# Patient Record
Sex: FEMALE | Race: BLACK OR AFRICAN AMERICAN | NOT HISPANIC OR LATINO | Employment: FULL TIME | URBAN - METROPOLITAN AREA
[De-identification: names, ages, dates, MRNs, and addresses within clinical notes are randomized per-mention and may not be internally consistent; named-entity substitution may affect disease eponyms.]

---

## 2017-11-10 ENCOUNTER — LAB REQUISITION (OUTPATIENT)
Dept: LAB | Facility: HOSPITAL | Age: 32
End: 2017-11-10

## 2017-11-10 DIAGNOSIS — Z00.00 ROUTINE GENERAL MEDICAL EXAMINATION AT A HEALTH CARE FACILITY: ICD-10-CM

## 2017-11-10 PROCEDURE — 36415 COLL VENOUS BLD VENIPUNCTURE: CPT | Performed by: OBSTETRICS & GYNECOLOGY

## 2017-11-18 LAB
EXTERNAL ABO GROUPING: NORMAL
EXTERNAL ANTIBODY SCREEN: NEGATIVE
EXTERNAL CHLAMYDIA SCREEN: NORMAL
EXTERNAL GONORRHEA SCREEN: NORMAL
EXTERNAL HEPATITIS B SURFACE ANTIGEN: NEGATIVE
EXTERNAL RH FACTOR: POSITIVE
EXTERNAL RUBELLA QUALITATIVE: NORMAL
EXTERNAL SYPHILIS RPR SCREEN: NORMAL

## 2017-12-08 ENCOUNTER — LAB REQUISITION (OUTPATIENT)
Dept: LAB | Facility: HOSPITAL | Age: 32
End: 2017-12-08

## 2017-12-08 DIAGNOSIS — Z34.01 ENCOUNTER FOR SUPERVISION OF NORMAL FIRST PREGNANCY IN FIRST TRIMESTER: ICD-10-CM

## 2017-12-08 DIAGNOSIS — Z00.00 ROUTINE GENERAL MEDICAL EXAMINATION AT A HEALTH CARE FACILITY: ICD-10-CM

## 2017-12-08 PROCEDURE — 36415 COLL VENOUS BLD VENIPUNCTURE: CPT | Performed by: OBSTETRICS & GYNECOLOGY

## 2017-12-19 ENCOUNTER — TRANSCRIBE ORDERS (OUTPATIENT)
Dept: WOMENS IMAGING | Facility: HOSPITAL | Age: 32
End: 2017-12-19

## 2017-12-19 ENCOUNTER — HOSPITAL ENCOUNTER (OUTPATIENT)
Dept: WOMENS IMAGING | Facility: HOSPITAL | Age: 32
Discharge: HOME | End: 2017-12-19
Attending: OBSTETRICS & GYNECOLOGY | Admitting: OBSTETRICS & GYNECOLOGY

## 2017-12-19 ENCOUNTER — OFFICE VISIT (OUTPATIENT)
Dept: OBSTETRICS AND GYNECOLOGY | Facility: HOSPITAL | Age: 32
End: 2017-12-19

## 2017-12-19 VITALS — SYSTOLIC BLOOD PRESSURE: 129 MMHG | DIASTOLIC BLOOD PRESSURE: 78 MMHG | WEIGHT: 161 LBS | BODY MASS INDEX: 27.64 KG/M2

## 2017-12-19 DIAGNOSIS — Q96.9 MONOSOMY X SYNDROME: ICD-10-CM

## 2017-12-19 DIAGNOSIS — O09.512 AMA (ADVANCED MATERNAL AGE) PRIMIGRAVIDA 35+, SECOND TRIMESTER: Primary | ICD-10-CM

## 2017-12-19 DIAGNOSIS — O09.519 ADVANCED MATERNAL AGE, PRIMIGRAVIDA, ANTEPARTUM: ICD-10-CM

## 2017-12-19 DIAGNOSIS — O09.512 AMA (ADVANCED MATERNAL AGE) PRIMIGRAVIDA 35+, SECOND TRIMESTER: ICD-10-CM

## 2017-12-19 DIAGNOSIS — O35.10X0 CHROMOSOMAL ABNORMALITY IN FETUS, AFFECTING MANAGEMENT OF MOTHER, ANTEPARTUM, SINGLE OR UNSPECIFIED FETUS: Primary | ICD-10-CM

## 2017-12-19 PROCEDURE — 76801 OB US < 14 WKS SINGLE FETUS: CPT | Performed by: OBSTETRICS & GYNECOLOGY

## 2017-12-19 PROCEDURE — 99242 OFF/OP CONSLTJ NEW/EST SF 20: CPT | Performed by: OBSTETRICS & GYNECOLOGY

## 2017-12-19 PROCEDURE — 76801 OB US < 14 WKS SINGLE FETUS: CPT

## 2017-12-19 RX ORDER — PRENATAL WITH FERROUS FUM AND FOLIC ACID 3080; 920; 120; 400; 22; 1.84; 3; 20; 10; 1; 12; 200; 27; 25; 2 [IU]/1; [IU]/1; MG/1; [IU]/1; MG/1; MG/1; MG/1; MG/1; MG/1; MG/1; UG/1; MG/1; MG/1; MG/1; MG/1
TABLET ORAL DAILY
COMMUNITY
End: 2017-12-23 | Stop reason: HOSPADM

## 2017-12-22 ENCOUNTER — HOSPITAL ENCOUNTER (INPATIENT)
Facility: HOSPITAL | Age: 32
LOS: 1 days | Discharge: HOME | End: 2017-12-23
Attending: OBSTETRICS & GYNECOLOGY | Admitting: OBSTETRICS & GYNECOLOGY

## 2017-12-22 ENCOUNTER — PREP FOR SURGERY (OUTPATIENT)
Dept: OTHER | Facility: HOSPITAL | Age: 32
End: 2017-12-22

## 2017-12-22 DIAGNOSIS — O02.1 IUFD AT LESS THAN 20 WEEKS OF GESTATION: Primary | ICD-10-CM

## 2017-12-22 DIAGNOSIS — O02.1 IUFD AT LESS THAN 20 WEEKS OF GESTATION: ICD-10-CM

## 2017-12-22 LAB
ABO GROUP BLD: NORMAL
BLD GP AB SCN SERPL QL: NEGATIVE
DEPRECATED RDW RBC AUTO: 42.5 FL (ref 37–54)
ERYTHROCYTE [DISTWIDTH] IN BLOOD BY AUTOMATED COUNT: 12.7 % (ref 11.3–14.5)
HCT VFR BLD AUTO: 38.7 % (ref 34.5–44)
HGB BLD-MCNC: 13.6 G/DL (ref 11.5–15.5)
MCH RBC QN AUTO: 32.1 PG (ref 27–31)
MCHC RBC AUTO-ENTMCNC: 35.1 G/DL (ref 32–36)
MCV RBC AUTO: 91.3 FL (ref 80–99)
PLATELET # BLD AUTO: 212 10*3/MM3 (ref 150–450)
PMV BLD AUTO: 10.3 FL (ref 6–12)
RBC # BLD AUTO: 4.24 10*6/MM3 (ref 3.89–5.14)
RH BLD: POSITIVE
WBC NRBC COR # BLD: 8.27 10*3/MM3 (ref 3.5–10.8)

## 2017-12-22 PROCEDURE — 86901 BLOOD TYPING SEROLOGIC RH(D): CPT | Performed by: OBSTETRICS & GYNECOLOGY

## 2017-12-22 PROCEDURE — 25010000002 METHYLERGONOVINE MALEATE PER 0.2 MG: Performed by: OBSTETRICS & GYNECOLOGY

## 2017-12-22 PROCEDURE — 88305 TISSUE EXAM BY PATHOLOGIST: CPT | Performed by: OBSTETRICS & GYNECOLOGY

## 2017-12-22 PROCEDURE — 85027 COMPLETE CBC AUTOMATED: CPT | Performed by: OBSTETRICS & GYNECOLOGY

## 2017-12-22 PROCEDURE — 86900 BLOOD TYPING SEROLOGIC ABO: CPT | Performed by: OBSTETRICS & GYNECOLOGY

## 2017-12-22 PROCEDURE — 86850 RBC ANTIBODY SCREEN: CPT | Performed by: OBSTETRICS & GYNECOLOGY

## 2017-12-22 PROCEDURE — 25010000002 BUTORPHANOL PER 1 MG: Performed by: OBSTETRICS & GYNECOLOGY

## 2017-12-22 RX ORDER — PROMETHAZINE HYDROCHLORIDE 25 MG/ML
12.5 INJECTION, SOLUTION INTRAMUSCULAR; INTRAVENOUS EVERY 6 HOURS PRN
Status: DISCONTINUED | OUTPATIENT
Start: 2017-12-22 | End: 2017-12-23 | Stop reason: SDUPTHER

## 2017-12-22 RX ORDER — OXYTOCIN/RINGER'S LACTATE 20/1000 ML
999 PLASTIC BAG, INJECTION (ML) INTRAVENOUS ONCE
Status: CANCELLED | OUTPATIENT
Start: 2017-12-22 | End: 2017-12-22

## 2017-12-22 RX ORDER — PROMETHAZINE HYDROCHLORIDE 12.5 MG/1
12.5 TABLET ORAL EVERY 6 HOURS PRN
Status: CANCELLED | OUTPATIENT
Start: 2017-12-22

## 2017-12-22 RX ORDER — PROMETHAZINE HYDROCHLORIDE 25 MG/ML
12.5 INJECTION, SOLUTION INTRAMUSCULAR; INTRAVENOUS EVERY 6 HOURS PRN
Status: CANCELLED | OUTPATIENT
Start: 2017-12-22

## 2017-12-22 RX ORDER — MAGNESIUM CARB/ALUMINUM HYDROX 105-160MG
30 TABLET,CHEWABLE ORAL ONCE
Status: CANCELLED | OUTPATIENT
Start: 2017-12-22 | End: 2017-12-22

## 2017-12-22 RX ORDER — FAMOTIDINE 20 MG/1
20 TABLET, FILM COATED ORAL EVERY 12 HOURS SCHEDULED
Status: CANCELLED | OUTPATIENT
Start: 2017-12-22

## 2017-12-22 RX ORDER — LIDOCAINE HYDROCHLORIDE 10 MG/ML
5 INJECTION, SOLUTION INFILTRATION; PERINEURAL AS NEEDED
Status: DISCONTINUED | OUTPATIENT
Start: 2017-12-22 | End: 2017-12-23 | Stop reason: HOSPADM

## 2017-12-22 RX ORDER — MORPHINE SULFATE 2 MG/ML
2 INJECTION, SOLUTION INTRAMUSCULAR; INTRAVENOUS
Status: CANCELLED | OUTPATIENT
Start: 2017-12-22 | End: 2018-01-01

## 2017-12-22 RX ORDER — MISOPROSTOL 200 UG/1
400 TABLET ORAL
Status: DISCONTINUED | OUTPATIENT
Start: 2017-12-23 | End: 2017-12-23 | Stop reason: HOSPADM

## 2017-12-22 RX ORDER — MAGNESIUM CARB/ALUMINUM HYDROX 105-160MG
30 TABLET,CHEWABLE ORAL ONCE
Status: DISCONTINUED | OUTPATIENT
Start: 2017-12-22 | End: 2017-12-23 | Stop reason: HOSPADM

## 2017-12-22 RX ORDER — ZOLPIDEM TARTRATE 5 MG/1
5 TABLET ORAL NIGHTLY PRN
Status: CANCELLED | OUTPATIENT
Start: 2017-12-22 | End: 2018-01-01

## 2017-12-22 RX ORDER — FAMOTIDINE 20 MG/1
20 TABLET, FILM COATED ORAL EVERY 12 HOURS SCHEDULED
Status: DISCONTINUED | OUTPATIENT
Start: 2017-12-22 | End: 2017-12-23 | Stop reason: HOSPADM

## 2017-12-22 RX ORDER — PROMETHAZINE HYDROCHLORIDE 12.5 MG/1
12.5 SUPPOSITORY RECTAL EVERY 6 HOURS PRN
Status: DISCONTINUED | OUTPATIENT
Start: 2017-12-22 | End: 2017-12-23 | Stop reason: SDUPTHER

## 2017-12-22 RX ORDER — OXYTOCIN/RINGER'S LACTATE 20/1000 ML
125 PLASTIC BAG, INJECTION (ML) INTRAVENOUS CONTINUOUS PRN
Status: CANCELLED | OUTPATIENT
Start: 2017-12-22 | End: 2017-12-23

## 2017-12-22 RX ORDER — FAMOTIDINE 10 MG/ML
20 INJECTION, SOLUTION INTRAVENOUS EVERY 12 HOURS SCHEDULED
Status: DISCONTINUED | OUTPATIENT
Start: 2017-12-22 | End: 2017-12-23 | Stop reason: HOSPADM

## 2017-12-22 RX ORDER — MISOPROSTOL 200 UG/1
800 TABLET ORAL AS NEEDED
Status: CANCELLED | OUTPATIENT
Start: 2017-12-22

## 2017-12-22 RX ORDER — TERBUTALINE SULFATE 1 MG/ML
0.25 INJECTION, SOLUTION SUBCUTANEOUS AS NEEDED
Status: DISCONTINUED | OUTPATIENT
Start: 2017-12-22 | End: 2017-12-23 | Stop reason: HOSPADM

## 2017-12-22 RX ORDER — ZOLPIDEM TARTRATE 5 MG/1
5 TABLET ORAL NIGHTLY PRN
Status: DISCONTINUED | OUTPATIENT
Start: 2017-12-22 | End: 2017-12-23 | Stop reason: HOSPADM

## 2017-12-22 RX ORDER — LIDOCAINE HYDROCHLORIDE 10 MG/ML
5 INJECTION, SOLUTION INFILTRATION; PERINEURAL AS NEEDED
Status: CANCELLED | OUTPATIENT
Start: 2017-12-22

## 2017-12-22 RX ORDER — METHYLERGONOVINE MALEATE 0.2 MG/ML
200 INJECTION INTRAVENOUS ONCE AS NEEDED
Status: CANCELLED | OUTPATIENT
Start: 2017-12-22

## 2017-12-22 RX ORDER — SODIUM CHLORIDE 0.9 % (FLUSH) 0.9 %
1-10 SYRINGE (ML) INJECTION AS NEEDED
Status: DISCONTINUED | OUTPATIENT
Start: 2017-12-22 | End: 2017-12-23 | Stop reason: HOSPADM

## 2017-12-22 RX ORDER — PROMETHAZINE HYDROCHLORIDE 12.5 MG/1
12.5 SUPPOSITORY RECTAL EVERY 6 HOURS PRN
Status: CANCELLED | OUTPATIENT
Start: 2017-12-22

## 2017-12-22 RX ORDER — BUTORPHANOL TARTRATE 1 MG/ML
1 INJECTION, SOLUTION INTRAMUSCULAR; INTRAVENOUS
Status: CANCELLED | OUTPATIENT
Start: 2017-12-22

## 2017-12-22 RX ORDER — ACETAMINOPHEN 325 MG/1
650 TABLET ORAL EVERY 4 HOURS PRN
Status: CANCELLED | OUTPATIENT
Start: 2017-12-22

## 2017-12-22 RX ORDER — MISOPROSTOL 200 UG/1
800 TABLET ORAL ONCE
Status: CANCELLED | OUTPATIENT
Start: 2017-12-22 | End: 2017-12-22

## 2017-12-22 RX ORDER — OXYCODONE HYDROCHLORIDE AND ACETAMINOPHEN 5; 325 MG/1; MG/1
1 TABLET ORAL EVERY 4 HOURS PRN
Status: CANCELLED | OUTPATIENT
Start: 2017-12-22 | End: 2018-01-01

## 2017-12-22 RX ORDER — OXYCODONE AND ACETAMINOPHEN 7.5; 325 MG/1; MG/1
2 TABLET ORAL EVERY 4 HOURS PRN
Status: CANCELLED | OUTPATIENT
Start: 2017-12-22 | End: 2018-01-01

## 2017-12-22 RX ORDER — CARBOPROST TROMETHAMINE 250 UG/ML
250 INJECTION, SOLUTION INTRAMUSCULAR AS NEEDED
Status: CANCELLED | OUTPATIENT
Start: 2017-12-22

## 2017-12-22 RX ORDER — TERBUTALINE SULFATE 1 MG/ML
0.25 INJECTION, SOLUTION SUBCUTANEOUS AS NEEDED
Status: CANCELLED | OUTPATIENT
Start: 2017-12-22

## 2017-12-22 RX ORDER — ACETAMINOPHEN 325 MG/1
650 TABLET ORAL EVERY 4 HOURS PRN
Status: DISCONTINUED | OUTPATIENT
Start: 2017-12-22 | End: 2017-12-23 | Stop reason: HOSPADM

## 2017-12-22 RX ORDER — METHYLERGONOVINE MALEATE 0.2 MG/ML
200 INJECTION INTRAVENOUS ONCE AS NEEDED
Status: COMPLETED | OUTPATIENT
Start: 2017-12-22 | End: 2017-12-22

## 2017-12-22 RX ORDER — PROMETHAZINE HYDROCHLORIDE 12.5 MG/1
12.5 TABLET ORAL EVERY 6 HOURS PRN
Status: DISCONTINUED | OUTPATIENT
Start: 2017-12-22 | End: 2017-12-23 | Stop reason: SDUPTHER

## 2017-12-22 RX ORDER — SODIUM CHLORIDE, SODIUM LACTATE, POTASSIUM CHLORIDE, CALCIUM CHLORIDE 600; 310; 30; 20 MG/100ML; MG/100ML; MG/100ML; MG/100ML
125 INJECTION, SOLUTION INTRAVENOUS CONTINUOUS
Status: CANCELLED | OUTPATIENT
Start: 2017-12-22

## 2017-12-22 RX ORDER — MISOPROSTOL 200 UG/1
800 TABLET ORAL ONCE
Status: COMPLETED | OUTPATIENT
Start: 2017-12-22 | End: 2017-12-22

## 2017-12-22 RX ORDER — SODIUM CHLORIDE, SODIUM LACTATE, POTASSIUM CHLORIDE, CALCIUM CHLORIDE 600; 310; 30; 20 MG/100ML; MG/100ML; MG/100ML; MG/100ML
125 INJECTION, SOLUTION INTRAVENOUS CONTINUOUS
Status: DISCONTINUED | OUTPATIENT
Start: 2017-12-22 | End: 2017-12-23 | Stop reason: HOSPADM

## 2017-12-22 RX ORDER — FAMOTIDINE 10 MG/ML
20 INJECTION, SOLUTION INTRAVENOUS EVERY 12 HOURS SCHEDULED
Status: CANCELLED | OUTPATIENT
Start: 2017-12-22

## 2017-12-22 RX ORDER — SODIUM CHLORIDE 0.9 % (FLUSH) 0.9 %
1-10 SYRINGE (ML) INJECTION AS NEEDED
Status: CANCELLED | OUTPATIENT
Start: 2017-12-22

## 2017-12-22 RX ORDER — ALPRAZOLAM 0.5 MG/1
0.5 TABLET ORAL 3 TIMES DAILY PRN
Status: DISCONTINUED | OUTPATIENT
Start: 2017-12-22 | End: 2017-12-23 | Stop reason: HOSPADM

## 2017-12-22 RX ADMIN — METHYLERGONOVINE MALEATE 200 MCG: 0.2 INJECTION INTRAVENOUS at 23:20

## 2017-12-22 RX ADMIN — BUTORPHANOL TARTRATE 1 MG: 2 INJECTION, SOLUTION INTRAMUSCULAR; INTRAVENOUS at 23:28

## 2017-12-22 RX ADMIN — SODIUM CHLORIDE, POTASSIUM CHLORIDE, SODIUM LACTATE AND CALCIUM CHLORIDE 125 ML/HR: 600; 310; 30; 20 INJECTION, SOLUTION INTRAVENOUS at 14:01

## 2017-12-22 RX ADMIN — BUTORPHANOL TARTRATE 1 MG: 2 INJECTION, SOLUTION INTRAMUSCULAR; INTRAVENOUS at 19:24

## 2017-12-22 RX ADMIN — SODIUM CHLORIDE, POTASSIUM CHLORIDE, SODIUM LACTATE AND CALCIUM CHLORIDE 125 ML/HR: 600; 310; 30; 20 INJECTION, SOLUTION INTRAVENOUS at 19:18

## 2017-12-22 RX ADMIN — MISOPROSTOL 800 MCG: 200 TABLET ORAL at 14:09

## 2017-12-22 RX ADMIN — ALPRAZOLAM 0.5 MG: 0.5 TABLET ORAL at 16:21

## 2017-12-22 RX ADMIN — BUTORPHANOL TARTRATE 1 MG: 2 INJECTION, SOLUTION INTRAMUSCULAR; INTRAVENOUS at 23:11

## 2017-12-22 NOTE — H&P
No changes to prenatal record.   14 wk IUFD sent from office for cytotec induction. Baby with turners on NIPT, and a large cystic hygroma seen at [facility] this week. No FHT in office today.

## 2017-12-23 VITALS
SYSTOLIC BLOOD PRESSURE: 101 MMHG | TEMPERATURE: 98.1 F | RESPIRATION RATE: 16 BRPM | DIASTOLIC BLOOD PRESSURE: 58 MMHG | HEIGHT: 65 IN | HEART RATE: 65 BPM | WEIGHT: 162 LBS | BODY MASS INDEX: 26.99 KG/M2

## 2017-12-23 PROBLEM — O36.4XX0: Status: ACTIVE | Noted: 2017-12-23

## 2017-12-23 PROBLEM — O02.1 IUFD AT LESS THAN 20 WEEKS OF GESTATION: Status: ACTIVE | Noted: 2017-12-23

## 2017-12-23 LAB
DEPRECATED RDW RBC AUTO: 42.5 FL (ref 37–54)
ERYTHROCYTE [DISTWIDTH] IN BLOOD BY AUTOMATED COUNT: 12.6 % (ref 11.3–14.5)
HCT VFR BLD AUTO: 34.4 % (ref 34.5–44)
HGB BLD-MCNC: 12 G/DL (ref 11.5–15.5)
MCH RBC QN AUTO: 31.8 PG (ref 27–31)
MCHC RBC AUTO-ENTMCNC: 34.9 G/DL (ref 32–36)
MCV RBC AUTO: 91.2 FL (ref 80–99)
PLATELET # BLD AUTO: 195 10*3/MM3 (ref 150–450)
PMV BLD AUTO: 9.3 FL (ref 6–12)
RBC # BLD AUTO: 3.77 10*6/MM3 (ref 3.89–5.14)
WBC NRBC COR # BLD: 8.5 10*3/MM3 (ref 3.5–10.8)

## 2017-12-23 PROCEDURE — 85027 COMPLETE CBC AUTOMATED: CPT | Performed by: OBSTETRICS & GYNECOLOGY

## 2017-12-23 PROCEDURE — 25010000002 BUTORPHANOL PER 1 MG: Performed by: OBSTETRICS & GYNECOLOGY

## 2017-12-23 RX ORDER — PROMETHAZINE HYDROCHLORIDE 25 MG/ML
12.5 INJECTION, SOLUTION INTRAMUSCULAR; INTRAVENOUS EVERY 6 HOURS PRN
Status: DISCONTINUED | OUTPATIENT
Start: 2017-12-23 | End: 2017-12-23 | Stop reason: HOSPADM

## 2017-12-23 RX ORDER — ACETAMINOPHEN 325 MG/1
650 TABLET ORAL EVERY 4 HOURS PRN
Status: DISCONTINUED | OUTPATIENT
Start: 2017-12-23 | End: 2017-12-23 | Stop reason: HOSPADM

## 2017-12-23 RX ORDER — MISOPROSTOL 200 UG/1
800 TABLET ORAL AS NEEDED
Status: DISCONTINUED | OUTPATIENT
Start: 2017-12-23 | End: 2017-12-23 | Stop reason: HOSPADM

## 2017-12-23 RX ORDER — IBUPROFEN 600 MG/1
600 TABLET ORAL EVERY 6 HOURS PRN
Qty: 24 TABLET | Refills: 1 | Status: SHIPPED | OUTPATIENT
Start: 2017-12-23 | End: 2019-05-15 | Stop reason: HOSPADM

## 2017-12-23 RX ORDER — OXYCODONE HYDROCHLORIDE AND ACETAMINOPHEN 5; 325 MG/1; MG/1
1 TABLET ORAL EVERY 4 HOURS PRN
Status: CANCELLED | OUTPATIENT
Start: 2017-12-23 | End: 2018-01-02

## 2017-12-23 RX ORDER — OXYCODONE HYDROCHLORIDE AND ACETAMINOPHEN 5; 325 MG/1; MG/1
1 TABLET ORAL EVERY 6 HOURS PRN
Qty: 18 TABLET | Refills: 0 | Status: SHIPPED | OUTPATIENT
Start: 2017-12-23 | End: 2019-05-15 | Stop reason: HOSPADM

## 2017-12-23 RX ORDER — ZOLPIDEM TARTRATE 5 MG/1
5 TABLET ORAL NIGHTLY PRN
Status: CANCELLED | OUTPATIENT
Start: 2017-12-23 | End: 2018-01-02

## 2017-12-23 RX ORDER — IBUPROFEN 600 MG/1
600 TABLET ORAL EVERY 6 HOURS PRN
Status: DISCONTINUED | OUTPATIENT
Start: 2017-12-23 | End: 2017-12-23 | Stop reason: HOSPADM

## 2017-12-23 RX ORDER — PROMETHAZINE HYDROCHLORIDE 12.5 MG/1
12.5 SUPPOSITORY RECTAL EVERY 6 HOURS PRN
Status: DISCONTINUED | OUTPATIENT
Start: 2017-12-23 | End: 2017-12-23 | Stop reason: HOSPADM

## 2017-12-23 RX ORDER — OXYCODONE AND ACETAMINOPHEN 7.5; 325 MG/1; MG/1
2 TABLET ORAL EVERY 4 HOURS PRN
Status: DISCONTINUED | OUTPATIENT
Start: 2017-12-23 | End: 2017-12-23 | Stop reason: HOSPADM

## 2017-12-23 RX ORDER — SODIUM CHLORIDE 0.9 % (FLUSH) 0.9 %
1-10 SYRINGE (ML) INJECTION AS NEEDED
Status: CANCELLED | OUTPATIENT
Start: 2017-12-23

## 2017-12-23 RX ORDER — CARBOPROST TROMETHAMINE 250 UG/ML
250 INJECTION, SOLUTION INTRAMUSCULAR AS NEEDED
Status: DISCONTINUED | OUTPATIENT
Start: 2017-12-23 | End: 2017-12-23 | Stop reason: HOSPADM

## 2017-12-23 RX ORDER — MORPHINE SULFATE 2 MG/ML
2 INJECTION, SOLUTION INTRAMUSCULAR; INTRAVENOUS
Status: DISCONTINUED | OUTPATIENT
Start: 2017-12-23 | End: 2017-12-23 | Stop reason: HOSPADM

## 2017-12-23 RX ORDER — PROMETHAZINE HYDROCHLORIDE 12.5 MG/1
12.5 TABLET ORAL EVERY 6 HOURS PRN
Status: DISCONTINUED | OUTPATIENT
Start: 2017-12-23 | End: 2017-12-23 | Stop reason: HOSPADM

## 2017-12-23 RX ORDER — OXYCODONE HYDROCHLORIDE AND ACETAMINOPHEN 5; 325 MG/1; MG/1
1 TABLET ORAL EVERY 4 HOURS PRN
Status: DISCONTINUED | OUTPATIENT
Start: 2017-12-23 | End: 2017-12-23 | Stop reason: HOSPADM

## 2017-12-23 RX ADMIN — OXYCODONE AND ACETAMINOPHEN 1 TABLET: 5; 325 TABLET ORAL at 06:10

## 2017-12-23 RX ADMIN — BUTORPHANOL TARTRATE 1 MG: 2 INJECTION, SOLUTION INTRAMUSCULAR; INTRAVENOUS at 07:35

## 2017-12-23 RX ADMIN — IBUPROFEN 600 MG: 600 TABLET, FILM COATED ORAL at 05:15

## 2017-12-23 NOTE — PLAN OF CARE
Problem: Patient Care Overview (Adult)  Goal: Adult Individualization and Mutuality  Outcome: Ongoing (interventions implemented as appropriate)   12/23/17 0730 12/23/17 0847   Individualization   Patient Specific Preferences --  Prefers to be called Sharon   Patient Specific Goals --  Begin healing process   Patient Specific Interventions --  Support during this time of loss   Mutuality/Individual Preferences   What Anxieties, Fears or Concerns Do You Have About Your Health or Care? None verbalized --    What Questions Do You Have About Your Health or Care? None verbalized --    What Information Would Help Us Give You More Personalized Care? None requested --

## 2017-12-23 NOTE — PLAN OF CARE
Problem:  Loss (Adult,Obstetrics,Pediatric)  Goal: Knowledge of Grieving Process   17 0842    Loss (Adult,Obstetrics,Pediatric)   Knowledge of Grieving Process making progress toward outcome

## 2017-12-23 NOTE — PLAN OF CARE
Problem:  Loss (Adult,Obstetrics,Pediatric)  Goal: Identify Related Risk Factors and Signs and Symptoms  Outcome: Ongoing (interventions implemented as appropriate)   17 0843    Loss   Related Risk Factors ( Loss) unexpected outcome   Signs and Symptoms ( Loss) sadness

## 2017-12-23 NOTE — PLAN OF CARE
Problem: Labor (Cervical Ripen, Induct, Augment) (Adult,Obstetrics,Pediatric)  Goal: Signs and Symptoms of Listed Potential Problems Will be Absent or Manageable (Labor)  Outcome: Outcome(s) achieved Date Met: 12/23/17 12/23/17 0843   Labor (Cervical Ripen, Induct, Augment)   Problems Assessed (Labor) pain   Problems Present (Labor) pain

## 2017-12-23 NOTE — L&D DELIVERY NOTE
[facility]  Vaginal Delivery Note    Delivery     Delivery: Vaginal, Spontaneous Delivery     YOB: 2019    Time of Birth: 11:07 PM      Anesthesia:       Delivering clinician: Farooq Busby    Forceps?   No   Vacuum? No    Shoulder dystocia present: No        Delivery narrative:  CTSP for baby in vaginal vault.  Fetus delivered intact, nuchal cyst noted.  Pt placed in lithotomy position and speculum exam showed placental fragments at os.  Placenta teased out in multiple fragments.  Uterus explored with ring with no further placenta retrieved.  Bleeding stable.  Pt given one dose methergine 0.2mg IM.  Will keep overnight on LH and discharge from here in the morning    Infant    Findings: unspecified sex  infant     Infant observations: Weight: No birth weight on file.   Length:    in  Observations/Comments:         Apgars: 0   @ 1 minute /    0   @ 5 minutes   Infant Name:      Placenta, Cord, and Fluid    Placenta delivered     at         Cord: Unknown  present.   Nuchal Cord?  no   Cord blood obtained:      Cord gases obtained:       Cord gas results: Venous:  No results found for: PHCVEN    Arterial:  No results found for: PHCART     Repair    Episiotomy: Not recorded    Lacerations: No   Estimated Blood Loss:       Suture used for repair: No repair needed     Complications  IUFD, garcía's syndrome    Disposition  Mother to Remain in LD  in stable condition currently.  Baby to remains with mom  in care of fetal loss team condition currently.      Farooq Busby MD  12/22/17  11:57 PM

## 2017-12-23 NOTE — PLAN OF CARE
Problem:  Loss (Adult,Obstetrics,Pediatric)  Goal: Loss Integration Strategies  Outcome: Ongoing (interventions implemented as appropriate)   17 0842    Loss (Adult,Obstetrics,Pediatric)   Loss Integration Strategies making progress toward outcome

## 2017-12-23 NOTE — PLAN OF CARE
Problem: Patient Care Overview (Adult)  Goal: Plan of Care Review  Outcome: Ongoing (interventions implemented as appropriate)   12/23/17 0802   Coping/Psychosocial Response Interventions   Plan Of Care Reviewed With patient;spouse   Patient Care Overview   Progress progress toward functional goals as expected

## 2017-12-23 NOTE — PLAN OF CARE
Problem: Patient Care Overview (Adult)  Goal: Discharge Needs Assessment  Outcome: Ongoing (interventions implemented as appropriate)   12/23/17 5020   Discharge Needs Assessment   Concerns To Be Addressed denies needs/concerns at this time   Equipment Needed After Discharge none   Discharge Disposition still a patient   Current Health   Anticipated Changes Related to Illness none   Self-Care   Equipment Currently Used at Home none   Living Environment   Transportation Available car;family or friend will provide

## 2017-12-23 NOTE — PLAN OF CARE
Problem: Labor (Cervical Ripen, Induct, Augment) (Adult,Obstetrics,Pediatric)  Goal: Signs and Symptoms of Listed Potential Problems Will be Absent or Manageable (Labor)  Outcome: Ongoing (interventions implemented as appropriate)   17   Labor (Cervical Ripen, Induct, Augment)   Problems Assessed (Labor) all   Problems Present (Labor) none       Problem:  Loss (Adult,Obstetrics,Pediatric)  Goal: Identify Related Risk Factors and Signs and Symptoms  Outcome: Ongoing (interventions implemented as appropriate)   17    Loss   Related Risk Factors ( Loss) (maternal h/o depression)   Signs and Symptoms ( Loss) crying     Goal: Knowledge of Grieving Process  Outcome: Ongoing (interventions implemented as appropriate)   17    Loss (Adult,Obstetrics,Pediatric)   Knowledge of Grieving Process making progress toward outcome     Goal: Loss Integration Strategies  Outcome: Ongoing (interventions implemented as appropriate)   17    Loss (Adult,Obstetrics,Pediatric)   Loss Integration Strategies making progress toward outcome

## 2017-12-27 LAB
CYTO UR: NORMAL
LAB AP CASE REPORT: NORMAL
LAB AP CLINICAL INFORMATION: NORMAL
Lab: NORMAL
PATH REPORT.FINAL DX SPEC: NORMAL
PATH REPORT.GROSS SPEC: NORMAL

## 2018-01-08 ENCOUNTER — TELEPHONE (OUTPATIENT)
Dept: LABOR AND DELIVERY | Facility: HOSPITAL | Age: 33
End: 2018-01-08

## 2018-01-08 NOTE — TELEPHONE ENCOUNTER
Spoke with Reina regarding hospital burial and need for [employer] Authorization to be initialed at her convenience sometime before January 22nd burial notification made.  Reina states she will contact me or Clinical House Supervisor to come by to initial.  States she and Rikki stayed in [place] for [place].  States they are doing OK and denied further questions or concerns at this time.  She has [facility] office contact information and will contact as desired.  Support group mentioned.  After phone call email sent with attachment with [employer] burial information sheet.

## 2018-01-16 ENCOUNTER — APPOINTMENT (OUTPATIENT)
Dept: WOMENS IMAGING | Facility: HOSPITAL | Age: 33
End: 2018-01-16
Attending: OBSTETRICS & GYNECOLOGY

## 2018-10-12 ENCOUNTER — LAB REQUISITION (OUTPATIENT)
Dept: LAB | Facility: HOSPITAL | Age: 33
End: 2018-10-12

## 2018-10-12 DIAGNOSIS — Z00.00 ROUTINE GENERAL MEDICAL EXAMINATION AT A HEALTH CARE FACILITY: ICD-10-CM

## 2018-10-12 LAB
EXTERNAL HEPATITIS B SURFACE ANTIGEN: NEGATIVE
EXTERNAL HEPATITIS C AB: NEGATIVE
EXTERNAL RUBELLA QUALITATIVE: NORMAL
EXTERNAL SYPHILIS RPR SCREEN: NORMAL
EXTERNAL URINE DRUG SCREEN: NEGATIVE
HIV1 P24 AG SERPL QL IA: NEGATIVE

## 2018-10-12 PROCEDURE — 36415 COLL VENOUS BLD VENIPUNCTURE: CPT | Performed by: OBSTETRICS & GYNECOLOGY

## 2018-10-26 ENCOUNTER — LAB REQUISITION (OUTPATIENT)
Dept: LAB | Facility: HOSPITAL | Age: 33
End: 2018-10-26

## 2018-10-26 DIAGNOSIS — Z00.00 ROUTINE GENERAL MEDICAL EXAMINATION AT A HEALTH CARE FACILITY: ICD-10-CM

## 2018-10-26 LAB
EXTERNAL CHLAMYDIA SCREEN: NEGATIVE
EXTERNAL GONORRHEA SCREEN: NEGATIVE

## 2018-10-26 PROCEDURE — 36415 COLL VENOUS BLD VENIPUNCTURE: CPT

## 2019-02-15 ENCOUNTER — LAB REQUISITION (OUTPATIENT)
Dept: LAB | Facility: HOSPITAL | Age: 34
End: 2019-02-15

## 2019-02-15 DIAGNOSIS — Z00.00 ROUTINE GENERAL MEDICAL EXAMINATION AT A HEALTH CARE FACILITY: ICD-10-CM

## 2019-02-15 LAB — EXTERNAL GTT 1 HOUR: 97

## 2019-02-15 PROCEDURE — 36415 COLL VENOUS BLD VENIPUNCTURE: CPT | Performed by: OBSTETRICS & GYNECOLOGY

## 2019-04-12 ENCOUNTER — TRANSCRIBE ORDERS (OUTPATIENT)
Dept: LAB | Facility: HOSPITAL | Age: 34
End: 2019-04-12

## 2019-04-12 ENCOUNTER — LAB (OUTPATIENT)
Dept: LAB | Facility: HOSPITAL | Age: 34
End: 2019-04-12

## 2019-04-12 DIAGNOSIS — Z36.0 RISK OF FETAL CHROMOSOME ANOMALY AFFECTING CARE OF MOTHER, ANTEPARTUM: ICD-10-CM

## 2019-04-12 DIAGNOSIS — Z36.0 RISK OF FETAL CHROMOSOME ANOMALY AFFECTING CARE OF MOTHER, ANTEPARTUM: Primary | ICD-10-CM

## 2019-04-12 PROCEDURE — 87081 CULTURE SCREEN ONLY: CPT

## 2019-04-14 LAB — BACTERIA SPEC AEROBE CULT: NORMAL

## 2019-05-10 ENCOUNTER — HOSPITAL ENCOUNTER (INPATIENT)
Facility: HOSPITAL | Age: 34
LOS: 5 days | Discharge: HOME | End: 2019-05-15
Attending: OBSTETRICS & GYNECOLOGY | Admitting: OBSTETRICS & GYNECOLOGY

## 2019-05-10 PROBLEM — Z34.90 PREGNANT: Status: ACTIVE | Noted: 2019-05-10

## 2019-05-10 LAB
ABO GROUP BLD: NORMAL
ALP SERPL-CCNC: 117 U/L (ref 39–117)
ALT SERPL W P-5'-P-CCNC: 9 U/L (ref 1–33)
AST SERPL-CCNC: 14 U/L (ref 1–32)
BILIRUB SERPL-MCNC: 0.6 MG/DL (ref 0.2–1.2)
BLD GP AB SCN SERPL QL: NEGATIVE
CREAT BLD-MCNC: 0.46 MG/DL (ref 0.57–1)
DEPRECATED RDW RBC AUTO: 45.3 FL (ref 37–54)
ERYTHROCYTE [DISTWIDTH] IN BLOOD BY AUTOMATED COUNT: 13.9 % (ref 12.3–15.4)
HCT VFR BLD AUTO: 39.4 % (ref 34–46.6)
HGB BLD-MCNC: 13.3 G/DL (ref 12–15.9)
LDH SERPL-CCNC: 178 U/L (ref 135–214)
MCH RBC QN AUTO: 30.2 PG (ref 26.6–33)
MCHC RBC AUTO-ENTMCNC: 33.8 G/DL (ref 31.5–35.7)
MCV RBC AUTO: 89.5 FL (ref 79–97)
PLATELET # BLD AUTO: 277 10*3/MM3 (ref 140–450)
PMV BLD AUTO: 10.1 FL (ref 6–12)
RBC # BLD AUTO: 4.4 10*6/MM3 (ref 3.77–5.28)
RH BLD: POSITIVE
T&S EXPIRATION DATE: NORMAL
URATE SERPL-MCNC: 4.5 MG/DL (ref 2.4–5.7)
WBC NRBC COR # BLD: 11.43 10*3/MM3 (ref 3.4–10.8)

## 2019-05-10 PROCEDURE — 83615 LACTATE (LD) (LDH) ENZYME: CPT | Performed by: OBSTETRICS & GYNECOLOGY

## 2019-05-10 PROCEDURE — 84550 ASSAY OF BLOOD/URIC ACID: CPT | Performed by: OBSTETRICS & GYNECOLOGY

## 2019-05-10 PROCEDURE — 86900 BLOOD TYPING SEROLOGIC ABO: CPT | Performed by: OBSTETRICS & GYNECOLOGY

## 2019-05-10 PROCEDURE — 25010000002 BUTORPHANOL PER 1 MG: Performed by: OBSTETRICS & GYNECOLOGY

## 2019-05-10 PROCEDURE — 86850 RBC ANTIBODY SCREEN: CPT | Performed by: OBSTETRICS & GYNECOLOGY

## 2019-05-10 PROCEDURE — 84460 ALANINE AMINO (ALT) (SGPT): CPT | Performed by: OBSTETRICS & GYNECOLOGY

## 2019-05-10 PROCEDURE — 85027 COMPLETE CBC AUTOMATED: CPT | Performed by: OBSTETRICS & GYNECOLOGY

## 2019-05-10 PROCEDURE — 82247 BILIRUBIN TOTAL: CPT | Performed by: OBSTETRICS & GYNECOLOGY

## 2019-05-10 PROCEDURE — 59025 FETAL NON-STRESS TEST: CPT

## 2019-05-10 PROCEDURE — 86901 BLOOD TYPING SEROLOGIC RH(D): CPT | Performed by: OBSTETRICS & GYNECOLOGY

## 2019-05-10 PROCEDURE — 82565 ASSAY OF CREATININE: CPT | Performed by: OBSTETRICS & GYNECOLOGY

## 2019-05-10 PROCEDURE — 84450 TRANSFERASE (AST) (SGOT): CPT | Performed by: OBSTETRICS & GYNECOLOGY

## 2019-05-10 PROCEDURE — 84075 ASSAY ALKALINE PHOSPHATASE: CPT | Performed by: OBSTETRICS & GYNECOLOGY

## 2019-05-10 RX ORDER — PRENATAL WITH FERROUS FUM AND FOLIC ACID 3080; 920; 120; 400; 22; 1.84; 3; 20; 10; 1; 12; 200; 27; 25; 2 [IU]/1; [IU]/1; MG/1; [IU]/1; MG/1; MG/1; MG/1; MG/1; MG/1; MG/1; UG/1; MG/1; MG/1; MG/1; MG/1
1 TABLET ORAL DAILY
COMMUNITY
End: 2020-10-09

## 2019-05-10 RX ORDER — ONDANSETRON 4 MG/1
4 TABLET, FILM COATED ORAL EVERY 6 HOURS PRN
Status: DISCONTINUED | OUTPATIENT
Start: 2019-05-10 | End: 2019-05-13 | Stop reason: HOSPADM

## 2019-05-10 RX ORDER — BUTORPHANOL TARTRATE 1 MG/ML
1 INJECTION, SOLUTION INTRAMUSCULAR; INTRAVENOUS
Status: DISCONTINUED | OUTPATIENT
Start: 2019-05-10 | End: 2019-05-13 | Stop reason: HOSPADM

## 2019-05-10 RX ORDER — LIDOCAINE HYDROCHLORIDE 10 MG/ML
5 INJECTION, SOLUTION EPIDURAL; INFILTRATION; INTRACAUDAL; PERINEURAL AS NEEDED
Status: DISCONTINUED | OUTPATIENT
Start: 2019-05-10 | End: 2019-05-13 | Stop reason: HOSPADM

## 2019-05-10 RX ORDER — ONDANSETRON 2 MG/ML
4 INJECTION INTRAMUSCULAR; INTRAVENOUS EVERY 6 HOURS PRN
Status: DISCONTINUED | OUTPATIENT
Start: 2019-05-10 | End: 2019-05-13 | Stop reason: HOSPADM

## 2019-05-10 RX ORDER — SODIUM CHLORIDE 0.9 % (FLUSH) 0.9 %
3-10 SYRINGE (ML) INJECTION AS NEEDED
Status: DISCONTINUED | OUTPATIENT
Start: 2019-05-10 | End: 2019-05-13 | Stop reason: HOSPADM

## 2019-05-10 RX ORDER — OXYTOCIN-SODIUM CHLORIDE 0.9% IV SOLN 30 UNIT/500ML 30-0.9/5 UT/ML-%
2-30 SOLUTION INTRAVENOUS
Status: DISCONTINUED | OUTPATIENT
Start: 2019-05-10 | End: 2019-05-13 | Stop reason: HOSPADM

## 2019-05-10 RX ORDER — SODIUM CHLORIDE 0.9 % (FLUSH) 0.9 %
3 SYRINGE (ML) INJECTION EVERY 12 HOURS SCHEDULED
Status: DISCONTINUED | OUTPATIENT
Start: 2019-05-10 | End: 2019-05-13 | Stop reason: HOSPADM

## 2019-05-10 RX ORDER — MAGNESIUM CARB/ALUMINUM HYDROX 105-160MG
30 TABLET,CHEWABLE ORAL ONCE
Status: DISCONTINUED | OUTPATIENT
Start: 2019-05-10 | End: 2019-05-13 | Stop reason: HOSPADM

## 2019-05-10 RX ADMIN — SODIUM CHLORIDE, POTASSIUM CHLORIDE, SODIUM LACTATE AND CALCIUM CHLORIDE 1000 ML: 600; 310; 30; 20 INJECTION, SOLUTION INTRAVENOUS at 17:56

## 2019-05-10 RX ADMIN — BUTORPHANOL TARTRATE 1 MG: 1 INJECTION, SOLUTION INTRAMUSCULAR; INTRAVENOUS at 22:44

## 2019-05-10 NOTE — H&P
[facility]  Obstetric History and Physical    Chief Complaint   Patient presents with   • DECEL IN OFFICE       Subjective     Patient is a 33 y.o. female  currently at 40w1d, who presented for NST in office and was found to have late decellerations.  BPP 8/ and EFW 8#6oz.  Patient was sent for induction of labor.    Her prenatal care is benign.  Her previous obstetric/gynecological history is noted for is remarkable for previous IUFD (Trisomy).    The following portions of the patients history were reviewed and updated as appropriate: current medications, allergies, past medical history, past surgical history, past family history, past social history and problem list .       Prenatal Information:  Prenatal Results     Initial Prenatal Labs     Test Value Reference Range Date Time    Hemoglobin        Hematocrit        Platelets 195 10*3/mm3 150 - 450 10*3/mm3 17 0925    Rubella IgG Immune   10/12/18     Hepatitis B SAg Negative   10/12/18     Hepatitis C Ab Negative   10/12/18     RPR Non-Reactive   10/12/18     ABO A   17 1351    Rh Positive   17 1351    Antibody Screen        HIV Negative   10/12/18     Urine Culture        Gonorrhea Negative   10/26/18     Chlamydia Negative   10/26/18     TSH              2nd and 3rd Trimester     Test Value Reference Range Date Time    Hemoglobin (repeated)        Hematocrit (repeated)        GCT        Antibody Screen (repeated)        GTT Fasting        GTT 1 Hr 97   02/15/19     GTT 2 Hr        GTT 3 Hr        Group B Strep No Group B Streptococcus isolated   19 1138          Drug Screening     Test Value Reference Range Date Time    Amphetamine Screen        Barbiturate Screen        Benzodiazepine Screen        Methadone Screen        Phencyclidine Screen        Opiates Screen        THC Screen        Cocaine Screen        Propoxyphene Screen        Buprenorphine Screen        Methamphetamine Screen        Oxycodone Screen         Tricyclic Antidepressants Screen              Other (Risk screening)     Test Value Reference Range Date Time    Varicella IgG        Parvovirus IgG        CMV IgG        Cystic Fibrosis        Hemoglobin electrophoresis        NIPT        MSAFP-4        AFP (for NTD only)                  External Prenatal Results     Pregnancy Outside Results - Transcribed From Office Records - See Scanned Records For Details     Test Value Date Time    Hgb 12.0 g/dL 17 0925    Hct 34.4 % 17 0925    ABO A  17 1351    Rh Positive  17 1351    Antibody Screen Negative  17 1351    Glucose Fasting GTT       Glucose Tolerance Test 1 hour 97  02/15/19     Glucose Tolerance Test 3 hour       Gonorrhea (discrete) Negative  10/26/18     Chlamydia (discrete) Negative  10/26/18     RPR Non-Reactive  10/12/18     VDRL       Syphilis Antibody       Rubella Immune  10/12/18     HBsAg Negative  10/12/18     Herpes Simplex Virus PCR       Herpes Simplex VIrus Culture       HIV Negative  10/12/18     Hep C RNA Quant PCR       Hep C Antibody Negative  10/12/18     AFP       Group B Strep No Group B Streptococcus isolated  19 1138    GBS Susceptibility to Clindamycin       GBS Susceptibility to Erythromycin       Fetal Fibronectin       Genetic Testing, Maternal Blood             Drug Screening     Test Value Date Time    Urine Drug Screen Negative  10/12/18     Amphetamine Screen       Barbiturate Screen       Benzodiazepine Screen       Methadone Screen       Phencyclidine Screen       Opiates Screen       THC Screen       Cocaine Screen       Propoxyphene Screen       Buprenorphine Screen       Methamphetamine Screen       Oxycodone Screen       Tricyclic Antidepressants Screen                    Past OB History:     Obstetric History       T0      L0     SAB0   TAB0   Ectopic0   Molar0   Multiple0   Live Births0       # Outcome Date GA Lbr Aaron/2nd Weight Sex Delivery Anes PTL Lv   2 Current             1 Para 12/22/17 14w5d    Vag-Spont None  FD      Name: ROOSEVELT,PENDING  FD      Apgar1:  0                Apgar5: 0          Past Medical History: Past Medical History:   Diagnosis Date   • Depression       Past Surgical History Past Surgical History:   Procedure Laterality Date   • TOOTH EXTRACTION     • WISDOM TOOTH EXTRACTION        Family History: History reviewed. No pertinent family history.   Social History:  reports that she has quit smoking. She has never used smokeless tobacco.   reports that she does not drink alcohol.   reports that she does not use drugs.        Review of Systems      Objective     Vital Signs Range for the last 24 hours  Temperature: Temp:  [98.3 °F (36.8 °C)] 98.3 °F (36.8 °C)   Temp Source: Temp src: Oral   BP:     Pulse:     Respirations: Resp:  [18] 18   SPO2:     O2 Amount (l/min):     O2 Devices     Weight: Weight:  [99.8 kg (220 lb)] 99.8 kg (220 lb)     Physical Examination: General appearance - alert, well appearing, and in no distress  Neck - supple, no significant adenopathy  Abdomen - soft, nontender, nondistended, no masses or organomegaly  Pelvic - normal external genitalia, vulva, vagina, cervix, uterus and adnexa  Musculoskeletal - no joint tenderness, deformity or swelling  Extremities - peripheral pulses normal, no pedal edema, no clubbing or cyanosis  Skin - normal coloration and turgor, no rashes, no suspicious skin lesions noted    Presentation: vtx   Cervix: Exam by:  MD   Dilation:  1-2   Effacement:  60   Station:  -2     Fetal Heart Rate Assessment   Now category 1 in L&D                              Uterine Assessment   Ctx's q2-4min                                      Laboratory Results: GBS neg    Assessment/Plan       Pregnant      Assessment & Plan    Assessment:  1.  Intrauterine pregnancy at 40w1d weeks gestation with reassuring fetal status.    2.  induction of labor  for decelleration on NST, otherwise reassuring  with favorable cervix  3.   Obstetrical history significant for is remarkable for previous IOL/delivery of IUFD.  4.  GBS status: Neg    Plan:  1. cervical ripening with  intra-uterine myrick catheter  2. Plan of care has been reviewed with patient and   3.  Risks, benefits of treatment plan have been discussed.  4.  All questions have been answered.    Jessica Buenrostro MD  5/10/2019  5:50 PM

## 2019-05-11 PROCEDURE — 63710000001 DIPHENHYDRAMINE PER 50 MG: Performed by: OBSTETRICS & GYNECOLOGY

## 2019-05-11 RX ORDER — ZOLPIDEM TARTRATE 5 MG/1
5 TABLET ORAL NIGHTLY PRN
Status: DISCONTINUED | OUTPATIENT
Start: 2019-05-11 | End: 2019-05-15 | Stop reason: HOSPADM

## 2019-05-11 RX ORDER — FAMOTIDINE 10 MG/ML
20 INJECTION, SOLUTION INTRAVENOUS EVERY 12 HOURS
Status: DISCONTINUED | OUTPATIENT
Start: 2019-05-11 | End: 2019-05-15 | Stop reason: HOSPADM

## 2019-05-11 RX ORDER — SODIUM CHLORIDE, SODIUM LACTATE, POTASSIUM CHLORIDE, CALCIUM CHLORIDE 600; 310; 30; 20 MG/100ML; MG/100ML; MG/100ML; MG/100ML
125 INJECTION, SOLUTION INTRAVENOUS CONTINUOUS
Status: DISCONTINUED | OUTPATIENT
Start: 2019-05-11 | End: 2019-05-15 | Stop reason: HOSPADM

## 2019-05-11 RX ORDER — FAMOTIDINE 20 MG/1
20 TABLET, FILM COATED ORAL 2 TIMES DAILY
Status: DISCONTINUED | OUTPATIENT
Start: 2019-05-11 | End: 2019-05-11

## 2019-05-11 RX ORDER — DIPHENHYDRAMINE HCL 25 MG
25 CAPSULE ORAL NIGHTLY PRN
Status: DISCONTINUED | OUTPATIENT
Start: 2019-05-11 | End: 2019-05-15 | Stop reason: HOSPADM

## 2019-05-11 RX ADMIN — DIPHENHYDRAMINE HYDROCHLORIDE 25 MG: 25 CAPSULE ORAL at 22:22

## 2019-05-11 RX ADMIN — OXYTOCIN 14 MILLI-UNITS/MIN: 10 INJECTION, SOLUTION INTRAMUSCULAR; INTRAVENOUS at 16:23

## 2019-05-11 RX ADMIN — FAMOTIDINE 20 MG: 10 INJECTION, SOLUTION INTRAVENOUS at 23:07

## 2019-05-11 RX ADMIN — OXYTOCIN 2 MILLI-UNITS/MIN: 10 INJECTION, SOLUTION INTRAMUSCULAR; INTRAVENOUS at 11:44

## 2019-05-11 RX ADMIN — FAMOTIDINE 20 MG: 10 INJECTION, SOLUTION INTRAVENOUS at 12:05

## 2019-05-11 RX ADMIN — SODIUM CHLORIDE, POTASSIUM CHLORIDE, SODIUM LACTATE AND CALCIUM CHLORIDE 125 ML/HR: 600; 310; 30; 20 INJECTION, SOLUTION INTRAVENOUS at 11:21

## 2019-05-11 NOTE — PROGRESS NOTES
Pt. Uncomfortable with ctx's    FHT's 150, mod robert, reactive, occ variable decel  Ctx's q3-6min    CE 2/60/-2, post    FB placed without difficulty    -d/w pt. Will check when FB out and see how she is progressing.  Options to just AROM vs. Pitocin at that point.    Jessica Buenrostro MD  05/10/19  11:24 PM

## 2019-05-11 NOTE — PROGRESS NOTES
FB out this AM.  Patient initially did not want pitocin or AROM, wanted to try to pump first.    CE 5-6/70/-2, very posterior    FHT's Cat 1  Ctx's q5-8min    D/w pt. Situation.  Advised pitocin first to get into labor as AROM risk for infection if prior to labor.  She is amenable.  Will allow her to eat first.    FOB and  at bedside.    Jessica Buenrostro MD  05/11/19  1:23 PM

## 2019-05-12 ENCOUNTER — ANESTHESIA EVENT (OUTPATIENT)
Dept: LABOR AND DELIVERY | Facility: HOSPITAL | Age: 34
End: 2019-05-12

## 2019-05-12 ENCOUNTER — ANESTHESIA (OUTPATIENT)
Dept: LABOR AND DELIVERY | Facility: HOSPITAL | Age: 34
End: 2019-05-12

## 2019-05-12 PROCEDURE — 25010000002 FENTANYL CITRATE (PF) 100 MCG/2ML SOLUTION: Performed by: ANESTHESIOLOGY

## 2019-05-12 PROCEDURE — 10907ZC DRAINAGE OF AMNIOTIC FLUID, THERAPEUTIC FROM PRODUCTS OF CONCEPTION, VIA NATURAL OR ARTIFICIAL OPENING: ICD-10-PCS | Performed by: OBSTETRICS & GYNECOLOGY

## 2019-05-12 PROCEDURE — 25010000002 ROPIVACAINE PER 1 MG: Performed by: ANESTHESIOLOGY

## 2019-05-12 PROCEDURE — C1755 CATHETER, INTRASPINAL: HCPCS

## 2019-05-12 PROCEDURE — C1755 CATHETER, INTRASPINAL: HCPCS | Performed by: ANESTHESIOLOGY

## 2019-05-12 PROCEDURE — 59025 FETAL NON-STRESS TEST: CPT

## 2019-05-12 RX ORDER — EPHEDRINE SULFATE/0.9% NACL/PF 25 MG/5 ML
5 SYRINGE (ML) INTRAVENOUS
Status: DISCONTINUED | OUTPATIENT
Start: 2019-05-12 | End: 2019-05-13 | Stop reason: HOSPADM

## 2019-05-12 RX ORDER — LIDOCAINE HYDROCHLORIDE AND EPINEPHRINE 15; 5 MG/ML; UG/ML
INJECTION, SOLUTION EPIDURAL AS NEEDED
Status: DISCONTINUED | OUTPATIENT
Start: 2019-05-12 | End: 2019-05-13 | Stop reason: SURG

## 2019-05-12 RX ORDER — DIPHENHYDRAMINE HYDROCHLORIDE 50 MG/ML
12.5 INJECTION INTRAMUSCULAR; INTRAVENOUS EVERY 8 HOURS PRN
Status: DISCONTINUED | OUTPATIENT
Start: 2019-05-12 | End: 2019-05-13 | Stop reason: HOSPADM

## 2019-05-12 RX ORDER — FENTANYL CITRATE 50 UG/ML
INJECTION, SOLUTION INTRAMUSCULAR; INTRAVENOUS AS NEEDED
Status: DISCONTINUED | OUTPATIENT
Start: 2019-05-12 | End: 2019-05-13 | Stop reason: SURG

## 2019-05-12 RX ORDER — ROPIVACAINE HYDROCHLORIDE 5 MG/ML
INJECTION, SOLUTION EPIDURAL; INFILTRATION; PERINEURAL AS NEEDED
Status: DISCONTINUED | OUTPATIENT
Start: 2019-05-12 | End: 2019-05-13 | Stop reason: SURG

## 2019-05-12 RX ORDER — ROPIVACAINE HYDROCHLORIDE 2 MG/ML
15 INJECTION, SOLUTION EPIDURAL; INFILTRATION; PERINEURAL CONTINUOUS
Status: DISCONTINUED | OUTPATIENT
Start: 2019-05-12 | End: 2019-05-15 | Stop reason: HOSPADM

## 2019-05-12 RX ADMIN — ROPIVACAINE HYDROCHLORIDE 10 ML: 5 INJECTION, SOLUTION EPIDURAL; INFILTRATION; PERINEURAL at 17:33

## 2019-05-12 RX ADMIN — LIDOCAINE HYDROCHLORIDE AND EPINEPHRINE 3 ML: 15; 5 INJECTION, SOLUTION EPIDURAL at 17:33

## 2019-05-12 RX ADMIN — SODIUM CHLORIDE, POTASSIUM CHLORIDE, SODIUM LACTATE AND CALCIUM CHLORIDE 125 ML/HR: 600; 310; 30; 20 INJECTION, SOLUTION INTRAVENOUS at 11:25

## 2019-05-12 RX ADMIN — SODIUM CHLORIDE, POTASSIUM CHLORIDE, SODIUM LACTATE AND CALCIUM CHLORIDE 125 ML/HR: 600; 310; 30; 20 INJECTION, SOLUTION INTRAVENOUS at 04:05

## 2019-05-12 RX ADMIN — FENTANYL CITRATE 100 MCG: 50 INJECTION, SOLUTION INTRAMUSCULAR; INTRAVENOUS at 17:33

## 2019-05-12 RX ADMIN — FAMOTIDINE 20 MG: 10 INJECTION, SOLUTION INTRAVENOUS at 18:47

## 2019-05-12 RX ADMIN — ROPIVACAINE HYDROCHLORIDE 15 ML/HR: 2 INJECTION, SOLUTION EPIDURAL; INFILTRATION at 17:33

## 2019-05-12 NOTE — ANESTHESIA PROCEDURE NOTES
Labor Epidural      Patient location during procedure: OB  Start Time: 5/12/2019 5:23 PM  Stop Time: 5/12/2019 5:45 PM  Performed By  Anesthesiologist: Reddy Solorzano MD  Preanesthetic Checklist  Completed: patient identified, site marked, surgical consent, pre-op evaluation, timeout performed, risks and benefits discussed and monitors and equipment checked  Prep:  Pt Position:sitting  Sterile Tech:cap, gloves, mask and sterile barrier  Prep:alcohol swabs  Monitoring:blood pressure monitoring  Epidural Block Procedure:  Approach:midline  Guidance:landmark technique  Location:L3-L4  Needle Type:Tuohy  Needle Gauge:17 G  Loss of Resistance Medium: air  Loss of Resistance: 5cm  Cath Depth at skin:10 cm  Paresthesia: none  Aspiration:negative  Test Dose:negative  Number of Attempts: 1  Post Assessment:  Dressing:occlusive dressing applied and secured with tape  Pt Tolerance:patient tolerated the procedure well with no apparent complications  Complications:no

## 2019-05-12 NOTE — PLAN OF CARE
Problem: Patient Care Overview  Goal: Plan of Care Review  Outcome: Ongoing (interventions implemented as appropriate)   05/12/19 0648   Coping/Psychosocial   Plan of Care Reviewed With patient   Plan of Care Review   Progress no change       Problem: Labor (Cervical Ripen, Induct, Augment) (Adult,Obstetrics,Pediatric)  Goal: Signs and Symptoms of Listed Potential Problems Will be Absent, Minimized or Managed (Labor)  Outcome: Ongoing (interventions implemented as appropriate)   05/12/19 0648   Goal/Outcome Evaluation   Problems Assessed (Labor) all   Problems Present (Labor) none

## 2019-05-12 NOTE — ANESTHESIA PREPROCEDURE EVALUATION
Anesthesia Evaluation     Patient summary reviewed and Nursing notes reviewed                Airway   Mallampati: I  TM distance: >3 FB  Neck ROM: full  No difficulty expected  Dental - normal exam     Pulmonary - negative pulmonary ROS and normal exam   Cardiovascular - negative cardio ROS and normal exam        Neuro/Psych- negative ROS  GI/Hepatic/Renal/Endo - negative ROS     Musculoskeletal (-) negative ROS    Abdominal  - normal exam    Bowel sounds: normal.   Substance History - negative use     OB/GYN    (+) Pregnant,         Other                        Anesthesia Plan    ASA 2 - emergent     epidural     Anesthetic plan, all risks, benefits, and alternatives have been provided, discussed and informed consent has been obtained with: patient.  Use of blood products discussed with patient .   Plan discussed with attending.

## 2019-05-12 NOTE — PROGRESS NOTES
Pt. Having painful contractions.    Standing at side of bed,  massaging patient during contractions but with positioning, unable to trace baby well    FHT's 140, appears to be reassuring when tracing.  Ctx's q3-4min    Pitocin @ 8    CE 6-/-1, still a little posterior and moderate consistency    -FSE placed without difficulty    -continue to expect     Jessica Buenrostro MD  19  2:00 PM

## 2019-05-12 NOTE — PROGRESS NOTES
Patient still feeling painful contractions and is fatigued at this point.    FHT's 140, mod robert, occ variable decel  Ctx's q2-5min    CE unchanged    Pitocin @ 4    D/w pt and  and .  Need to put in IUPC and increase pitocin to get adequate.  Advised epidural.  Reviewed large baby, growth at admission EFW 8#6oz.  She agrees to proceed with POC.    Jessica Buenrostro MD  05/12/19  5:25 PM

## 2019-05-13 PROCEDURE — 0KQM0ZZ REPAIR PERINEUM MUSCLE, OPEN APPROACH: ICD-10-PCS | Performed by: OBSTETRICS & GYNECOLOGY

## 2019-05-13 PROCEDURE — 25010000002 KETOROLAC TROMETHAMINE PER 15 MG: Performed by: OBSTETRICS & GYNECOLOGY

## 2019-05-13 RX ORDER — LANOLIN 100 %
OINTMENT (GRAM) TOPICAL AS NEEDED
Status: DISCONTINUED | OUTPATIENT
Start: 2019-05-13 | End: 2019-05-15 | Stop reason: HOSPADM

## 2019-05-13 RX ORDER — OXYTOCIN-SODIUM CHLORIDE 0.9% IV SOLN 30 UNIT/500ML 30-0.9/5 UT/ML-%
85 SOLUTION INTRAVENOUS ONCE
Status: DISCONTINUED | OUTPATIENT
Start: 2019-05-13 | End: 2019-05-13 | Stop reason: HOSPADM

## 2019-05-13 RX ORDER — IBUPROFEN 600 MG/1
600 TABLET ORAL EVERY 6 HOURS SCHEDULED
Status: DISCONTINUED | OUTPATIENT
Start: 2019-05-13 | End: 2019-05-15 | Stop reason: HOSPADM

## 2019-05-13 RX ORDER — CARBOPROST TROMETHAMINE 250 UG/ML
250 INJECTION, SOLUTION INTRAMUSCULAR AS NEEDED
Status: DISCONTINUED | OUTPATIENT
Start: 2019-05-13 | End: 2019-05-13 | Stop reason: HOSPADM

## 2019-05-13 RX ORDER — DOCUSATE SODIUM 100 MG/1
100 CAPSULE, LIQUID FILLED ORAL 2 TIMES DAILY
Status: DISCONTINUED | OUTPATIENT
Start: 2019-05-13 | End: 2019-05-15 | Stop reason: HOSPADM

## 2019-05-13 RX ORDER — ACETAMINOPHEN 325 MG/1
650 TABLET ORAL EVERY 4 HOURS PRN
Status: DISCONTINUED | OUTPATIENT
Start: 2019-05-13 | End: 2019-05-13 | Stop reason: HOSPADM

## 2019-05-13 RX ORDER — MISOPROSTOL 200 UG/1
800 TABLET ORAL AS NEEDED
Status: DISCONTINUED | OUTPATIENT
Start: 2019-05-13 | End: 2019-05-13 | Stop reason: HOSPADM

## 2019-05-13 RX ORDER — KETOROLAC TROMETHAMINE 30 MG/ML
30 INJECTION, SOLUTION INTRAMUSCULAR; INTRAVENOUS ONCE
Status: COMPLETED | OUTPATIENT
Start: 2019-05-13 | End: 2019-05-13

## 2019-05-13 RX ORDER — BISACODYL 10 MG
10 SUPPOSITORY, RECTAL RECTAL DAILY PRN
Status: DISCONTINUED | OUTPATIENT
Start: 2019-05-14 | End: 2019-05-15 | Stop reason: HOSPADM

## 2019-05-13 RX ORDER — OXYTOCIN-SODIUM CHLORIDE 0.9% IV SOLN 30 UNIT/500ML 30-0.9/5 UT/ML-%
650 SOLUTION INTRAVENOUS ONCE
Status: DISCONTINUED | OUTPATIENT
Start: 2019-05-13 | End: 2019-05-13 | Stop reason: HOSPADM

## 2019-05-13 RX ORDER — IBUPROFEN 600 MG/1
600 TABLET ORAL EVERY 6 HOURS SCHEDULED
Status: DISCONTINUED | OUTPATIENT
Start: 2019-05-13 | End: 2019-05-13

## 2019-05-13 RX ORDER — METHYLERGONOVINE MALEATE 0.2 MG/ML
200 INJECTION INTRAVENOUS ONCE AS NEEDED
Status: DISCONTINUED | OUTPATIENT
Start: 2019-05-13 | End: 2019-05-13 | Stop reason: HOSPADM

## 2019-05-13 RX ORDER — IBUPROFEN 600 MG/1
600 TABLET ORAL EVERY 6 HOURS PRN
Status: DISCONTINUED | OUTPATIENT
Start: 2019-05-13 | End: 2019-05-13 | Stop reason: HOSPADM

## 2019-05-13 RX ORDER — SODIUM CHLORIDE 0.9 % (FLUSH) 0.9 %
1-10 SYRINGE (ML) INJECTION AS NEEDED
Status: DISCONTINUED | OUTPATIENT
Start: 2019-05-13 | End: 2019-05-15 | Stop reason: HOSPADM

## 2019-05-13 RX ADMIN — OXYTOCIN 650 ML/HR: 10 INJECTION INTRAVENOUS at 01:23

## 2019-05-13 RX ADMIN — DOCUSATE SODIUM 100 MG: 100 CAPSULE, LIQUID FILLED ORAL at 09:03

## 2019-05-13 RX ADMIN — IBUPROFEN 600 MG: 600 TABLET ORAL at 23:48

## 2019-05-13 RX ADMIN — Medication: at 04:52

## 2019-05-13 RX ADMIN — DOCUSATE SODIUM 100 MG: 100 CAPSULE, LIQUID FILLED ORAL at 21:52

## 2019-05-13 RX ADMIN — WITCH HAZEL 1 PAD: 500 SOLUTION RECTAL; TOPICAL at 04:52

## 2019-05-13 RX ADMIN — OXYTOCIN 85 ML/HR: 10 INJECTION INTRAVENOUS at 02:00

## 2019-05-13 RX ADMIN — KETOROLAC TROMETHAMINE 30 MG: 30 INJECTION, SOLUTION INTRAMUSCULAR at 04:53

## 2019-05-13 RX ADMIN — IBUPROFEN 600 MG: 600 TABLET ORAL at 03:04

## 2019-05-13 RX ADMIN — IBUPROFEN 600 MG: 600 TABLET ORAL at 13:57

## 2019-05-13 NOTE — LACTATION NOTE
This note was copied from a baby's chart.     05/13/19 1115   Nutrition   Feeding Readiness Cues rooting   Feeding Method breastfeeding   Feeding Tolerance/Success coordinated suck;coordinated swallow;arousal required   Feeding Interventions latch assistance provided;arousal required   Additional Documentation LATCH Score (Group)   Breastfeeding Session   Breastfeeding breastfeeding, bilateral   Infant Positioning cradle;cross-cradle   Effective Latch During Feeding yes   Suck/Swallow Coordination present   Signs of Milk Transfer audible swallow;infant jaw motion present   LATCH Score   Latch 2-->grasps breast, tongue down, lips flanged, rhythmic sucking   Audible Swallowing 1-->a few with stimulation   Type of Nipple 2-->everted (after stimulation)   Comfort (Breast/Nipple) 2-->soft/nontender   Hold (Positioning) 1-->minimal assist, teach one side, mother does other, staff holds   Latch Score 8

## 2019-05-13 NOTE — L&D DELIVERY NOTE
2019    Patient:Reina Benitez    MR#:[id number]    Vaginal Delivery Note  34 y.o. 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   No   Vacuum? No    Shoulder dystocia present: No          Infant    Findings: female  infant     Infant observations: Weight: 4255 g (9 lb 6.1 oz)     Observations/Comments:         Apgars: 8   @ 1 minute /    9   @ 5 minutes         Placenta, Cord, and Fluid    Placenta delivered  Spontaneous  at  2019  1:23 AM     Cord: 3 vessels  present.   Nuchal Cord?  no   Cord blood obtained: Yes    Cord gases obtained:  No    Cord gas results: Pending         Repair    Episiotomy: No   Lacerations: Yes  Laceration Information  Laceration Repaired?   Perineal: 2nd  Yes    Periurethral:         Labial:         Sulcus:         Vaginal: No       Cervical: No          Suture used for repair: 2-0 Vicryl   Estimated Blood Loss:   400 mls.         Complications  none    Disposition  Mother to Mother Baby/Postpartum  in stable condition currently.  Baby to remains with mom  in stable condition currently.      Jessica Buenrostro MD  19  1:38 AM

## 2019-05-13 NOTE — PROGRESS NOTES
Patient feeling rectal pressure.    FHT's now category 1, but did have some late decellerations and coupling ctx's  Patient was 7cm at that point and turned into [place] roll position.    Contraction pattern now q2-3min    CE complete/+1    Patient placed in throne position.    -will start to push in about 30min.  -expect .    Jessica Buenrostro MD  19  10:33 PM

## 2019-05-13 NOTE — PLAN OF CARE
Problem: Patient Care Overview  Goal: Plan of Care Review  Outcome: Ongoing (interventions implemented as appropriate)   05/12/19 0648 05/13/19 0315 05/13/19 0343   Coping/Psychosocial   Plan of Care Reviewed With --  patient;spouse --    Plan of Care Review   Progress no change --  --    OTHER   Outcome Summary --  --  Patient doing well. Delivered 0118. VSS. Baby breast feeding and doing well.     Goal: Individualization and Mutuality  Outcome: Ongoing (interventions implemented as appropriate)    Goal: Discharge Needs Assessment  Outcome: Ongoing (interventions implemented as appropriate)    Goal: Interprofessional Rounds/Family Conf  Outcome: Ongoing (interventions implemented as appropriate)      Problem: Labor (Cervical Ripen, Induct, Augment) (Adult,Obstetrics,Pediatric)  Goal: Signs and Symptoms of Listed Potential Problems Will be Absent, Minimized or Managed (Labor)  Outcome: Outcome(s) achieved Date Met: 05/13/19

## 2019-05-13 NOTE — LACTATION NOTE
05/13/19 1115   Maternal Information   Date of Referral 05/13/19   Person Making Referral other (see comments)  (courtesy)   Maternal Reason for Referral breastfeeding currently   Maternal Assessment   Breast Size Issue none   Breast Shape Bilateral:;round   Breast Density Bilateral:;soft   Nipples Bilateral:;everted   Maternal Infant Feeding   Maternal Emotional State assist needed;anxious   Infant Positioning cradle;cross-cradle   Signs of Milk Transfer infant jaw motion present   Pain with Feeding no   Comfort Measures Before/During Feeding latch adjusted;infant position adjusted;maternal position adjusted   Nipple Shape After Feeding, Right round;symmetrical;appropriately projected   Latch Assistance yes   Equipment Type   Breast Pump Type double electric, personal   Reproductive Interventions   Breast Care: Breastfeeding frequency of feeding adjusted   Breastfeeding Assistance assisted with positioning;feeding cue recognition promoted;feeding on demand promoted;feeding session observed;infant latch-on verified;infant stimulated to wakeful state;support offered   Breastfeeding Support encouragement provided;lactation counseling provided

## 2019-05-13 NOTE — PROGRESS NOTES
5/13/2019  PPD #1/2    Subjective   Reina feels well.  Patient describes her lochia as moderate.  Pain is well controlled       Objective   Temp: Temp:  [97.8 °F (36.6 °C)-101 °F (38.3 °C)] 99 °F (37.2 °C) Temp src: Oral   BP: BP: (109-143)/(56-92) 114/62        Pulse: Heart Rate:  [] 79  RR: Resp:  [16-18] 16    General:  No acute distress   Abdomen: Fundus firm and beneath umbilicus   Pelvis: deferred     Lab Results   Component Value Date    WBC 11.43 (H) 05/10/2019    HGB 13.3 05/10/2019    HCT 39.4 05/10/2019    MCV 89.5 05/10/2019     05/10/2019    HEPBSAG Negative 10/12/2018     These labs are predelivery.   Assessment  1. PPD# 1/2 after vaginal delivery    Plan  1. Routine postpartum care.  2. Baby girl, breastfeeding.      This note has been electronically signed.    Sienna Vang, APRN  May 13, 2019

## 2019-05-13 NOTE — ANESTHESIA POSTPROCEDURE EVALUATION
Patient: Reina Benitez    Procedure Summary     Date:  05/12/19 Room / Location:      Anesthesia Start:  1723 Anesthesia Stop:  05/13/19 0118    Procedure:  LABOR ANALGESIA Diagnosis:      Scheduled Providers:   Provider:  Reddy Solorzano MD    Anesthesia Type:  epidural ASA Status:  2 - Emergent          Anesthesia Type: epidural  Last vitals  BP   114/62 (05/13/19 0415)   Temp   99 °F (37.2 °C) (05/13/19 0415)   Pulse   79 (05/13/19 0415)   Resp   16 (05/13/19 0415)     SpO2         Post Anesthesia Care and Evaluation    Patient location during evaluation: bedside  Patient participation: complete - patient participated  Level of consciousness: awake and alert  Pain management: adequate  Airway patency: patent  Anesthetic complications: No anesthetic complications    Cardiovascular status: acceptable  Respiratory status: acceptable  Hydration status: acceptable  Post Neuraxial Block status: Motor and sensory function returned to baseline and No signs or symptoms of PDPH

## 2019-05-13 NOTE — PLAN OF CARE
Problem: Patient Care Overview  Goal: Plan of Care Review  Outcome: Ongoing (interventions implemented as appropriate)   05/13/19 1826   Coping/Psychosocial   Plan of Care Reviewed With patient   Plan of Care Review   Progress improving     Goal: Individualization and Mutuality  Outcome: Ongoing (interventions implemented as appropriate)    Goal: Discharge Needs Assessment  Outcome: Ongoing (interventions implemented as appropriate)    Goal: Interprofessional Rounds/Family Conf  Outcome: Ongoing (interventions implemented as appropriate)      Problem: Breastfeeding (Adult,Obstetrics,Pediatric)  Goal: Signs and Symptoms of Listed Potential Problems Will be Absent, Minimized or Managed (Breastfeeding)  Outcome: Ongoing (interventions implemented as appropriate)      Problem: Postpartum (Vaginal Delivery) (Adult,Obstetrics,Pediatric)  Goal: Signs and Symptoms of Listed Potential Problems Will be Absent, Minimized or Managed (Postpartum)  Outcome: Ongoing (interventions implemented as appropriate)

## 2019-05-14 LAB
BASOPHILS # BLD AUTO: 0.01 10*3/MM3 (ref 0–0.2)
BASOPHILS NFR BLD AUTO: 0.1 % (ref 0–1.5)
DEPRECATED RDW RBC AUTO: 45 FL (ref 37–54)
EOSINOPHIL # BLD AUTO: 0.1 10*3/MM3 (ref 0–0.4)
EOSINOPHIL NFR BLD AUTO: 0.9 % (ref 0.3–6.2)
ERYTHROCYTE [DISTWIDTH] IN BLOOD BY AUTOMATED COUNT: 14.1 % (ref 12.3–15.4)
HCT VFR BLD AUTO: 30.3 % (ref 34–46.6)
HGB BLD-MCNC: 10.4 G/DL (ref 12–15.9)
IMM GRANULOCYTES # BLD AUTO: 0.03 10*3/MM3 (ref 0–0.05)
IMM GRANULOCYTES NFR BLD AUTO: 0.3 % (ref 0–0.5)
LYMPHOCYTES # BLD AUTO: 2.97 10*3/MM3 (ref 0.7–3.1)
LYMPHOCYTES NFR BLD AUTO: 25.7 % (ref 19.6–45.3)
MCH RBC QN AUTO: 30.1 PG (ref 26.6–33)
MCHC RBC AUTO-ENTMCNC: 34.3 G/DL (ref 31.5–35.7)
MCV RBC AUTO: 87.6 FL (ref 79–97)
MONOCYTES # BLD AUTO: 0.92 10*3/MM3 (ref 0.1–0.9)
MONOCYTES NFR BLD AUTO: 8 % (ref 5–12)
NEUTROPHILS # BLD AUTO: 7.54 10*3/MM3 (ref 1.7–7)
NEUTROPHILS NFR BLD AUTO: 65 % (ref 42.7–76)
PLATELET # BLD AUTO: 230 10*3/MM3 (ref 140–450)
PMV BLD AUTO: 9.5 FL (ref 6–12)
RBC # BLD AUTO: 3.46 10*6/MM3 (ref 3.77–5.28)
WBC NRBC COR # BLD: 11.57 10*3/MM3 (ref 3.4–10.8)

## 2019-05-14 PROCEDURE — 85025 COMPLETE CBC W/AUTO DIFF WBC: CPT | Performed by: OBSTETRICS & GYNECOLOGY

## 2019-05-14 RX ADMIN — DOCUSATE SODIUM 100 MG: 100 CAPSULE, LIQUID FILLED ORAL at 08:12

## 2019-05-14 RX ADMIN — IBUPROFEN 600 MG: 600 TABLET ORAL at 17:53

## 2019-05-14 RX ADMIN — IBUPROFEN 600 MG: 600 TABLET ORAL at 12:19

## 2019-05-14 RX ADMIN — IBUPROFEN 600 MG: 600 TABLET ORAL at 06:14

## 2019-05-14 NOTE — PAYOR COMM NOTE
"Reina Albert (34 y.o. Female)     Auth#[id number]    From: America Tavares  #[phone]  Fax#[phone]      Date of Birth Social Security Number Address Home Phone MRN    1985  [address] [facility] [address] [phone] [id number]    [employer] Marital Status          None        Admission Date Admission Type Admitting Provider Attending Provider Department, Room/Bed    5/10/19 Elective Jessica Buenrostro MD Carbajal, Tracey Owensby, MD [facility] MOTHER BABY 4B, N442/1    Discharge Date Discharge Disposition Discharge Destination                       Attending Provider:  Jessica Buenrostro MD    Allergies:  No Known Allergies    Isolation:  None   Infection:  None   Code Status:  CPR    Ht:  165.1 cm (65\")   Wt:  99.8 kg (220 lb)    Admission Cmt:  None   Principal Problem:  None                Active Insurance as of 5/10/2019     Primary Coverage     Payor Plan Insurance Group Employer/Plan Group    [email or web address] PPO [id number]     Payor Plan Address Payor Plan Phone Number Payor Plan Fax Number Effective Dates    [address] [phone]  1/1/2017 - None Entered    [facility] [id number]       Subscriber Name Subscriber Birth Date Member ID       RIKKI ALBERT 1985 [id number]                 Emergency Contacts      (Rel.) Home Phone Work Phone Mobile Phone    Rikki Albert (Spouse) [phone] -- --            ICU Vital Signs     Row Name 05/14/19 0830 05/14/19 0700 05/13/19 2330 05/13/19 1600 05/13/19 0815       Vitals    Temp  --  97.8 °F (36.6 °C)  97.8 °F (36.6 °C)  98.1 °F (36.7 °C)  98.1 °F (36.7 °C)    Temp src  --  Oral  Oral  Oral  Oral    Pulse  --  69  79  81  65    Heart Rate Source  --  Monitor  Monitor  Monitor  Monitor    Resp  --  16  16  16  16    Resp Rate Source  --  Visual  --  Visual  Visual    BP  --  106/69  96/51  111/66  108/71       Oxygen Therapy    Device " (Oxygen Therapy)  room air  --  --  --  --    Row Name 05/13/19 0415 05/13/19 0315 05/13/19 0235 05/13/19 0220 05/13/19 0205       Vitals    Temp  99 °F (37.2 °C)  99.2 °F (37.3 °C)  99.1 °F (37.3 °C)  --  --    Temp src  Oral  Oral  Oral  --  --    Pulse  79  81  90  86  78    Heart Rate Source  Monitor  Monitor  --  --  --    Resp  16  16  16  --  --    Resp Rate Source  Visual  Visual  Visual  --  --    BP  114/62  127/74  127/64  130/70  120/59    Row Name 05/13/19 0150 05/13/19 0135 05/13/19 0120 05/13/19 0100 05/13/19 0010       Vitals    Temp  --  98.1 °F (36.7 °C)  --  98.6 °F (37 °C)  100.2 °F (37.9 °C)    Temp src  --  Oral  --  Oral  Axillary    Pulse  84  91  110  --  --    Resp  --  18  --  --  --    Resp Rate Source  --  Visual  --  --  --    BP  125/58  126/58  143/60  --  --    Row Name 05/13/19 0005 05/12/19 2351 05/12/19 2336 05/12/19 2320 05/12/19 2305       Vitals    Temp  98.9 °F (37.2 °C)  101 °F (38.3 °C)  (Abnormal)   --  --  --    Temp src  Oral  Axillary  --  --  --    Pulse  83  86  90  86  101    Resp  --  18  --  --  --    Resp Rate Source  --  Visual  --  --  --    BP  124/92  126/65  126/61  131/65  131/68    Row Name 05/12/19 2250 05/12/19 2236 05/12/19 2220 05/12/19 2207 05/12/19 2151       Vitals    Pulse  106  105  109  100  85    BP  129/71  129/78  134/77  132/71  112/56    Row Name 05/12/19 2135 05/12/19 2120 05/12/19 2106 05/12/19 2050 05/12/19 2036       Vitals    Temp  --  98.3 °F (36.8 °C)  --  --  --    Temp src  --  Oral  --  --  --    Pulse  96  93  96  101  96    Resp  --  16  --  --  --    Resp Rate Source  --  Visual  --  --  --    BP  116/62  109/59  135/74  135/77  138/82    Row Name 05/12/19 2020 05/12/19 2005 05/12/19 1950 05/12/19 1935 05/12/19 1920       Vitals    Temp  --  --  --  --  98 °F (36.7 °C)    Temp src  --  --  --  --  Oral    Pulse  83  95  80  80  78    Resp  --  --  --  --  16    Resp Rate Source  --  --  --  --  Visual    BP  128/66  125/67  130/72   131/65  123/67    Row Name 05/12/19 1905 05/12/19 1850 05/12/19 1846 05/12/19 1820 05/12/19 1806       Vitals    Temp  --  --  --  --  97.8 °F (36.6 °C)    Temp src  --  --  --  --  Oral    Pulse  90  87  77  85  79    Heart Rate Source  --  --  --  --  Monitor    Resp  --  --  --  --  16    Resp Rate Source  --  --  --  --  Visual    BP  118/76  127/71  129/64  129/74  119/65    Row Name 05/12/19 1754 05/12/19 1751 05/12/19 1748 05/12/19 1745 05/12/19 1742       Vitals    Pulse  84  86  82  88  86    BP  122/68  131/75  122/71  118/74  120/70    Row Name 05/12/19 1739 05/12/19 1736 05/12/19 1733 05/12/19 1730 05/12/19 1538       Vitals    Pulse  86  82  73  72  79    Resp  --  --  --  --  16    Resp Rate Source  --  --  --  --  Visual    BP  119/71  121/71  132/82  122/70  141/78    Row Name 05/12/19 1440 05/12/19 1236                Vitals    Temp  98.1 °F (36.7 °C)  98 °F (36.7 °C)       Temp src  Oral  Oral       Pulse  78  66       Heart Rate Source  Monitor  Monitor       Resp  16  16       Resp Rate Source  Stethoscope  Monitor       BP  113/71  117/64           Hospital Medications (active)       Dose Frequency Start End    benzocaine (AMERICAINE) 20 % rectal ointment 1 application 1 application As Needed 5/13/2019     Sig - Route: Insert 1 application into the rectum As Needed for Hemorrhoids. - Rectal    benzocaine-lanolin-aloe vera (DERMOPLAST) 20-0.5 % topical spray  As Needed 5/13/2019     Sig - Route: Apply  topically to the appropriate area as directed As Needed for Mild Pain  (perineal pain). - Topical    bisacodyl (DULCOLAX) suppository 10 mg 10 mg Daily PRN 5/14/2019     Sig - Route: Insert 1 suppository into the rectum Daily As Needed for Constipation. - Rectal    diphenhydrAMINE (BENADRYL) capsule 25 mg 25 mg Nightly PRN 5/11/2019     Sig - Route: Take 1 capsule by mouth At Night As Needed for Sleep. - Oral    docusate sodium (COLACE) capsule 100 mg 100 mg 2 Times Daily 5/13/2019     Sig -  Route: Take 1 capsule by mouth 2 (Two) Times a Day. - Oral    famotidine (PEPCID) injection 20 mg 20 mg Every 12 Hours 5/11/2019     Sig - Route: Infuse 2 mL into a venous catheter Every 12 (Twelve) Hours. - Intravenous    hydrocortisone (ANUSOL-HC) 2.5 % rectal cream 1 application 1 application As Needed 5/13/2019     Sig - Route: Insert 1 application into the rectum As Needed for Hemorrhoids. - Rectal    ibuprofen (ADVIL,MOTRIN) tablet 600 mg 600 mg Every 6 Hours Scheduled 5/13/2019     Sig - Route: Take 1 tablet by mouth Every 6 (Six) Hours. - Oral    lactated ringers infusion 125 mL/hr Continuous 5/11/2019     Sig - Route: Infuse 125 mL/hr into a venous catheter Continuous. - Intravenous    lanolin ointment  As Needed 5/13/2019     Sig - Route: Apply  topically to the appropriate area as directed As Needed for Dry Skin. - Topical    magnesium hydroxide (MILK OF MAGNESIA) suspension 2400 mg/10mL 10 mL 10 mL Daily PRN 5/13/2019     Sig - Route: Take 10 mL by mouth Daily As Needed for Constipation. - Oral    ropivacaine (NAROPIN) 0.2 % injection 15 mL/hr Continuous 5/12/2019     Sig - Route: 30 mg/hr by Epidural route Continuous. - Epidural    sodium chloride 0.9 % flush 1-10 mL 1-10 mL As Needed 5/13/2019     Sig - Route: Infuse 1-10 mL into a venous catheter As Needed for Line Care. - Intravenous    witch hazel-glycerin (TUCKS) pad 1 pad 1 each As Needed 5/13/2019     Sig - Route: Apply 1 pad topically to the appropriate area as directed As Needed for Irritation or Hemorrhoids. - Topical    zolpidem (AMBIEN) tablet 5 mg 5 mg Nightly PRN 5/11/2019 5/21/2019    Sig - Route: Take 1 tablet by mouth At Night As Needed for Sleep. - Oral          Lab Results (last 24 hours)     Procedure Component Value Units Date/Time    CBC & Differential [[id number]] Collected:  05/14/19 0558    Specimen:  Blood Updated:  05/14/19 0707    Narrative:       The following orders were created for panel order CBC & Differential.  Procedure                                Abnormality         Status                     ---------                               -----------         ------                     CBC Auto Differential[[id number]]        Abnormal            Final result                 Please view results for these tests on the individual orders.    CBC Auto Differential [[id number]]  (Abnormal) Collected:  05/14/19 0558    Specimen:  Blood Updated:  05/14/19 0707     WBC 11.57 10*3/mm3      RBC 3.46 10*6/mm3      Hemoglobin 10.4 g/dL      Hematocrit 30.3 %      MCV 87.6 fL      MCH 30.1 pg      MCHC 34.3 g/dL      RDW 14.1 %      RDW-SD 45.0 fl      MPV 9.5 fL      Platelets 230 10*3/mm3      Neutrophil % 65.0 %      Lymphocyte % 25.7 %      Monocyte % 8.0 %      Eosinophil % 0.9 %      Basophil % 0.1 %      Immature Grans % 0.3 %      Neutrophils, Absolute 7.54 10*3/mm3      Lymphocytes, Absolute 2.97 10*3/mm3      Monocytes, Absolute 0.92 10*3/mm3      Eosinophils, Absolute 0.10 10*3/mm3      Basophils, Absolute 0.01 10*3/mm3      Immature Grans, Absolute 0.03 10*3/mm3         Imaging Results (last 24 hours)     ** No results found for the last 24 hours. **        Orders (last 48 hrs)     Start     Ordered    05/14/19 0800  Sitz Bath  3 Times Daily     Comments:  PRN    05/13/19 0330    05/14/19 0600  CBC & Differential  Timed     Comments:  Postpartum Day 1      05/13/19 0330    05/14/19 0600  Hemoglobin & Hematocrit, Blood  Timed,   Status:  Canceled     Comments:  Postpartum Day 1      05/13/19 0330    05/14/19 0600  CBC Auto Differential  PROCEDURE ONCE     Comments:  Postpartum Day 1      05/14/19 0001    05/14/19 0000  bisacodyl (DULCOLAX) suppository 10 mg  Daily PRN      05/13/19 0330    05/13/19 1200  ibuprofen (ADVIL,MOTRIN) tablet 600 mg  Every 6 Hours Scheduled      05/13/19 0440    05/13/19 0900  docusate sodium (COLACE) capsule 100 mg  2 Times Daily      05/13/19 0330    05/13/19 0600  ibuprofen (ADVIL,MOTRIN) tablet 600 mg  Every 6  Hours Scheduled,   Status:  Discontinued      05/13/19 0330    05/13/19 0530  ketorolac (TORADOL) injection 30 mg  Once      05/13/19 0436    05/13/19 0448  lanolin ointment  As Needed      05/13/19 0450    05/13/19 0437  benzocaine-lanolin-aloe vera (DERMOPLAST) 20-0.5 % topical spray  As Needed,   Status:  Discontinued      05/13/19 0439    05/13/19 0331  Code Status and Medical Interventions:  Continuous      05/13/19 0330    05/13/19 0331  Vital Signs Per hospital policy  Per Hospital Policy      05/13/19 0330    05/13/19 0331  Up Ad Yue  Until Discontinued      05/13/19 0330 05/13/19 0331  Ambulate Patient  Every Shift      05/13/19 0330 05/13/19 0331  Fundal and Lochia Check  Per Hospital Policy     Comments:  Q 15 min x 4, Q 30 min x 2, then Q Shift    05/13/19 0330    05/13/19 0331  RN to Assess Rh Status & Place RhIG Evaluation Order if Indicated  Continuous      05/13/19 0330 05/13/19 0331  Bladder Assessment  Once     Comments:  Postpartum 1) Upon admission to unit and every 4 hours PRN until voiding.  2) Out of bed to void in 8 hours.    05/13/19 0330 05/13/19 0331  Straight Catheter  Once     Comments:  Postpartum: If distended and unable to void, may repeat once.    05/13/19 0330 05/13/19 0331  Indwelling Urinary Catheter  Once     Comments:  Postpartum : After straight cathed x2 or if greater than 1000mL residual, insert indwelling urinary catheter until further MD order.    05/13/19 0330    05/13/19 0331  Breast pump to bed  Once      05/13/19 0330    05/13/19 0331  Notify Physician  Until Discontinued      05/13/19 0330    05/13/19 0331  Diet Regular  Diet Effective Now      05/13/19 0330    05/13/19 0331  Advance Diet as Tolerated  Until Discontinued      05/13/19 0330 05/13/19 0331  If indicated -- Please administer RH Immunoglobulin based on results of cord blood evaluation and fetal screen lab tests, pharmacy to dispense  Continuous     Comments:  See process instructions for  reference range details.    05/13/19 0330    05/13/19 0330  sodium chloride 0.9 % flush 1-10 mL  As Needed      05/13/19 0330 05/13/19 0330  magnesium hydroxide (MILK OF MAGNESIA) suspension 2400 mg/10mL 10 mL  Daily PRN      05/13/19 0330    05/13/19 0330  benzocaine-lanolin-aloe vera (DERMOPLAST) 20-0.5 % topical spray  As Needed      05/13/19 0330 05/13/19 0330  witch hazel-glycerin (TUCKS) pad 1 pad  As Needed      05/13/19 0330 05/13/19 0330  hydrocortisone (ANUSOL-HC) 2.5 % rectal cream 1 application  As Needed      05/13/19 0330 05/13/19 0330  benzocaine (AMERICAINE) 20 % rectal ointment 1 application  As Needed      05/13/19 0330 05/13/19 0245  oxytocin in sodium chloride (PITOCIN) 30 UNIT/500ML infusion solution  Once,   Status:  Discontinued      05/13/19 0159 05/13/19 0245  oxytocin in sodium chloride (PITOCIN) 30 UNIT/500ML infusion solution  Once,   Status:  Discontinued      05/13/19 0159 05/13/19 0159  Vital Signs Per Hospital Policy  Per Hospital Policy      05/13/19 0159 05/13/19 0159  Up Ad Yue  Until Discontinued      05/13/19 0159 05/13/19 0159  Strict Intake and Output  Every Shift      05/13/19 0159 05/13/19 0159  Fundal & Lochia Check  Every Shift      05/13/19 0159 05/13/19 0159  Notify Physician (specified)  Until Discontinued      05/13/19 0159 05/13/19 0159  Diet Regular  Diet Effective Now,   Status:  Canceled      05/13/19 0159 05/13/19 0159  Advance Diet as Tolerated  Until Discontinued      05/13/19 0159 05/13/19 0159  acetaminophen (TYLENOL) tablet 650 mg  Every 4 Hours PRN,   Status:  Discontinued      05/13/19 0159 05/13/19 0159  ibuprofen (ADVIL,MOTRIN) tablet 600 mg  Every 6 Hours PRN,   Status:  Discontinued      05/13/19 0159 05/13/19 0159  methylergonovine (METHERGINE) injection 200 mcg  Once As Needed,   Status:  Discontinued      05/13/19 0159    05/13/19 0159  carboprost (HEMABATE) injection 250 mcg  As Needed,   Status:   Discontinued      05/13/19 0159    05/13/19 0159  misoprostol (CYTOTEC) tablet 800 mcg  As Needed,   Status:  Discontinued      05/13/19 0159    05/13/19 0159  Transfer to Postpartum When Criteria Met  Continuous      05/13/19 0159    05/13/19 0138  VTE Prophylaxis Not Indicated: Recent Trauma and/or Surgery (2); </= 3 (Low Risk)  Once      05/13/19 0138    05/12/19 1815  ropivacaine (NAROPIN) 0.2 % injection  Continuous      05/12/19 1718    05/12/19 1718  ePHEDrine Sulfate 5 mg (25 MG/5ML syringe)  Every 10 Minutes PRN,   Status:  Discontinued      05/12/19 1718    05/12/19 1718  Vital Signs Per Anesthesia Guidelines  Continuous     Comments:  Every 3 Minutes x20 Minutes Following Epidural Dosing, Then Every 15 Minutes if Stable    05/12/19 1718    05/12/19 1718  Start IV (16 or 18 Gauge)  Once      05/12/19 1718    05/12/19 1718  Cardiac Monitoring  Continuous      05/12/19 1718    05/12/19 1718  Fetal Heart Rate Monitor  Once      05/12/19 1718    05/12/19 1718  Nurse or Anesthesiologist to Remain With Patient for 15 Minutes Following Dosing  Continuous      05/12/19 1718    05/12/19 1718  Facilitate Maternal Position on Side & Maintain Uterine Displacement  Continuous      05/12/19 1718    05/12/19 1718  Consult Anesthesia Prior to Changing Epidural Infusion / Rate  Continuous      05/12/19 1718    05/12/19 1718  Notify Provider  Until Discontinued      05/12/19 1718    05/12/19 1718  diphenhydrAMINE (BENADRYL) injection 12.5 mg  Every 8 Hours PRN,   Status:  Discontinued      05/12/19 1718    05/12/19 1717  lactated ringers bolus 1,000 mL  As Needed,   Status:  Discontinued      05/12/19 1718    05/11/19 1755  diphenhydrAMINE (BENADRYL) capsule 25 mg  Nightly PRN      05/11/19 1757    05/11/19 1755  zolpidem (AMBIEN) tablet 5 mg  Nightly PRN      05/11/19 1757    05/11/19 1300  famotidine (PEPCID) injection 20 mg  Every 12 Hours      05/11/19 1201    05/11/19 1215  lactated ringers infusion  Continuous       05/11/19 1120    05/10/19 2100  sodium chloride 0.9 % flush 3 mL  Every 12 Hours Scheduled,   Status:  Discontinued      05/10/19 1737    05/10/19 1830  oxytocin in sodium chloride (PITOCIN) 30 UNIT/500ML infusion solution  Titrated,   Status:  Discontinued      05/10/19 1737    05/10/19 1830  mineral oil liquid 30 mL  Once,   Status:  Discontinued      05/10/19 1737    05/10/19 1733  lidocaine PF 1% (XYLOCAINE) injection 5 mL  As Needed,   Status:  Discontinued      05/10/19 1737    05/10/19 1733  sodium chloride 0.9 % flush 3-10 mL  As Needed,   Status:  Discontinued      05/10/19 1737    05/10/19 1733  butorphanol (STADOL) injection 1 mg  Every 2 Hours PRN,   Status:  Discontinued      05/10/19 1737    05/10/19 1733  butorphanol (STADOL) injection 2 mg  Every 2 Hours PRN,   Status:  Discontinued      05/10/19 1737    05/10/19 1733  ondansetron (ZOFRAN) tablet 4 mg  Every 6 Hours PRN,   Status:  Discontinued      05/10/19 1737    05/10/19 1733  ondansetron (ZOFRAN) injection 4 mg  Every 6 Hours PRN,   Status:  Discontinued      05/10/19 1737    05/10/19 0000  Chlamydia trachomatis, Neisseria gonorrhoeae, PCR w/ confirmation - Swab, Vagina     Comments:  This is an external result entered through the Results Console.      05/10/19 1737    05/10/19 0000  Gonorrhea Screen - Swab,     Comments:  This is an external result entered through the Results Console.      05/10/19 1737    05/10/19 0000  Urine Drug Screen - Urine, Clean Catch     Comments:  This is an external result entered through the Results Console.      05/10/19 1737    05/10/19 0000  Hepatitis C Antibody     Comments:  This is an external result entered through the Results Console.      05/10/19 1737    05/10/19 0000  GTT 1 Hour     Comments:  This is an external result entered through the Results Console.      05/10/19 1737    05/10/19 0000  Hepatitis B Surface Antigen     Comments:  This is an external result entered through the Results Console.       05/10/19 1737    05/10/19 0000  RPR     Comments:  This is an external result entered through the Results Console.      05/10/19 1737    05/10/19 0000  Rubella Antibody, IgG     Comments:  This is an external result entered through the Results Console.      05/10/19 1737    05/10/19 0000  HIV-1 Antibody, EIA     Comments:  This is an external result entered through the Results Console.      05/10/19 1737    Unscheduled  Position Change - For Intra-Uterine Resusitation for Hypertonus, HyperStimulation or Non-Reassuring Fetal Status  As Needed      05/10/19 1737    Unscheduled  Fundal & Lochia Check  As Needed     Comments:  Every 15 Minutes x4, Then Every 30 Minutes x2, Then Every Shift    05/13/19 0159    Unscheduled  Apply Ice to Perineum  As Needed      05/13/19 0159    Unscheduled  Bladder Assessment  As Needed      05/13/19 0159    Unscheduled  Apply Ice to Perineum  As Needed     Comments:  For 20 min q 2 hrs    05/13/19 0330    Unscheduled  Waffle Cushion  As Needed     Comments:  For perineal discomfort    05/13/19 0330    Unscheduled  Donut Ring  As Needed     Comments:  For perineal pain    05/13/19 0330    Unscheduled  Kpad  As Needed     Comments:  For pain    05/13/19 0330    Unscheduled  Warm compress  As Needed      05/13/19 0330    Unscheduled  Apply ace wrap, tight bra, or binder  As Needed      05/13/19 0330    Unscheduled  Apply ice packs  As Needed      05/13/19 0330    --  Prenatal Vit-Fe Fumarate-FA (PRENATAL 27-1) 27-1 MG tablet tablet  Daily      05/10/19 1550    Pending  Diet Regular  Diet Effective Now     Comments:  PATIENT WOULD LIKE PANCAKES AND SYRUP FOR BREAKFAST THAT SHE ASKED FOR EARLIER AND DID NOT RECEIVE    Pending           Operative/Procedure Notes (last 48 hours) (Notes from 5/12/2019 10:31 AM through 5/14/2019 10:31 AM)      Jessica Buenrostro MD at 5/13/2019  1:38 AM            5/13/2019    Patient:Reina Benitez    MR#:[id number]    Vaginal Delivery Note  34 y.o. 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   No   Vacuum? No    Shoulder dystocia present: No          Infant    Findings: female  infant     Infant observations: Weight: 4255 g (9 lb 6.1 oz)     Observations/Comments:         Apgars: 8   @ 1 minute /    9   @ 5 minutes         Placenta, Cord, and Fluid    Placenta delivered  Spontaneous  at  2019  1:23 AM     Cord: 3 vessels  present.   Nuchal Cord?  no   Cord blood obtained: Yes    Cord gases obtained:  No    Cord gas results: Pending         Repair    Episiotomy: No   Lacerations: Yes  Laceration Information  Laceration Repaired?   Perineal: 2nd  Yes    Periurethral:         Labial:         Sulcus:         Vaginal: No       Cervical: No          Suture used for repair: 2-0 Vicryl   Estimated Blood Loss:   400 mls.         Complications  none    Disposition  Mother to Mother Baby/Postpartum  in stable condition currently.  Baby to remains with mom  in stable condition currently.      Jessica Buenrostro MD  19  1:38 AM            Electronically signed by Jessica Buenrostro MD at 2019  1:40 AM          Physician Progress Notes (last 24 hours) (Notes from 2019 10:31 AM through 2019 10:31 AM)      Clare Fernandez APRN at 2019  8:32 AM          2019  PPD #1    Subjective   Reina feels well.  Patient describes her lochia less than menses.  Pain is well controlled       Objective   Temp: Temp:  [97.8 °F (36.6 °C)-98.1 °F (36.7 °C)] 97.8 °F (36.6 °C) Temp src: Oral   BP: BP:  ()/(51-69) 106/69        Pulse: Heart Rate:  [69-81] 69  RR: Resp:  [16] 16    General:  No acute distress   Abdomen: Fundus firm and beneath umbilicus   Pelvis: deferred     Lab Results   Component Value Date    WBC 11.57 (H) 05/14/2019    HGB 10.4 (L) 05/14/2019    HCT 30.3 (L) 05/14/2019    MCV 87.6 05/14/2019     05/14/2019    HEPBSAG Negative 10/12/2018       Assessment  1. PPD# 1 after vaginal delivery    Plan  1. Routine postpartum care.      This note has been electronically signed.    SWETHA Chavez  May 14, 2019    Electronically signed by Clare Fernandez APRN at 5/14/2019  8:33 AM       Consult Notes (last 24 hours) (Notes from 5/13/2019 10:31 AM through 5/14/2019 10:31 AM)     No notes of this type exist for this encounter.

## 2019-05-14 NOTE — LACTATION NOTE
This note was copied from a baby's chart.     05/14/19 1050   Nutrition   Feeding Method breastfeeding   Feeding Tolerance/Success coordinated swallow;coordinated suck   Additional Documentation LATCH Score (Group)   Breastfeeding Session   Breastfeeding Time, Left (min) 20   Breastfeeding Time, Right (min) 20   Breastfeeding breastfeeding, bilateral   Infant Positioning cross-cradle   Effective Latch During Feeding yes   Suck/Swallow Coordination present   Signs of Milk Transfer infant jaw motion present   LATCH Score   Latch 2-->grasps breast, tongue down, lips flanged, rhythmic sucking   Audible Swallowing 1-->a few with stimulation   Type of Nipple 2-->everted (after stimulation)   Comfort (Breast/Nipple) 2-->soft/nontender   Hold (Positioning) 2-->no assist from staff, mother able to position/hold infant   Latch Score 9

## 2019-05-14 NOTE — LACTATION NOTE
05/14/19 1125   Maternal Information   Date of Referral 05/14/19   Person Making Referral other (see comments)  (courtesy)   Maternal Reason for Referral breastfeeding currently   Maternal Assessment   Breast Size Issue none   Breast Shape Bilateral:;round   Breast Density Bilateral:;soft   Nipples Bilateral:;everted   Left Nipple Symptoms tender   Right Nipple Symptoms tender   Maternal Infant Feeding   Maternal Emotional State independent   Infant Positioning cross-cradle   Signs of Milk Transfer infant jaw motion present;audible swallow   Pain with Feeding no   Latch Assistance no

## 2019-05-14 NOTE — PROGRESS NOTES
5/14/2019  PPD #1    Subjective   Reina feels well.  Patient describes her lochia less than menses.  Pain is well controlled       Objective   Temp: Temp:  [97.8 °F (36.6 °C)-98.1 °F (36.7 °C)] 97.8 °F (36.6 °C) Temp src: Oral   BP: BP: ()/(51-69) 106/69        Pulse: Heart Rate:  [69-81] 69  RR: Resp:  [16] 16    General:  No acute distress   Abdomen: Fundus firm and beneath umbilicus   Pelvis: deferred     Lab Results   Component Value Date    WBC 11.57 (H) 05/14/2019    HGB 10.4 (L) 05/14/2019    HCT 30.3 (L) 05/14/2019    MCV 87.6 05/14/2019     05/14/2019    HEPBSAG Negative 10/12/2018       Assessment  1. PPD# 1 after vaginal delivery    Plan  1. Routine postpartum care.      This note has been electronically signed.    Clare Fernandez, APRN  May 14, 2019

## 2019-05-14 NOTE — PLAN OF CARE
Problem: Patient Care Overview  Goal: Plan of Care Review  Outcome: Ongoing (interventions implemented as appropriate)   05/14/19 0051   Coping/Psychosocial   Plan of Care Reviewed With patient   Plan of Care Review   Progress improving   OTHER   Outcome Summary VSS, breastfeeding at least q 3 hours and on-demand, pain controlled, fundus firm & midline, lochia light     Goal: Individualization and Mutuality  Outcome: Ongoing (interventions implemented as appropriate)    Goal: Discharge Needs Assessment  Outcome: Ongoing (interventions implemented as appropriate)      Problem: Breastfeeding (Adult,Obstetrics,Pediatric)  Goal: Signs and Symptoms of Listed Potential Problems Will be Absent, Minimized or Managed (Breastfeeding)  Outcome: Ongoing (interventions implemented as appropriate)      Problem: Postpartum (Vaginal Delivery) (Adult,Obstetrics,Pediatric)  Goal: Signs and Symptoms of Listed Potential Problems Will be Absent, Minimized or Managed (Postpartum)  Outcome: Outcome(s) achieved Date Met: 05/14/19

## 2019-05-14 NOTE — PLAN OF CARE
Problem: Patient Care Overview  Goal: Plan of Care Review  Outcome: Ongoing (interventions implemented as appropriate)    Goal: Discharge Needs Assessment  Outcome: Ongoing (interventions implemented as appropriate)    Goal: Interprofessional Rounds/Family Conf  Outcome: Ongoing (interventions implemented as appropriate)      Problem: Postpartum (Vaginal Delivery) (Adult,Obstetrics,Pediatric)  Goal: Signs and Symptoms of Listed Potential Problems Will be Absent, Minimized or Managed (Postpartum)  Outcome: Ongoing (interventions implemented as appropriate)

## 2019-05-15 VITALS
SYSTOLIC BLOOD PRESSURE: 106 MMHG | DIASTOLIC BLOOD PRESSURE: 60 MMHG | TEMPERATURE: 98.4 F | BODY MASS INDEX: 36.65 KG/M2 | RESPIRATION RATE: 16 BRPM | HEIGHT: 65 IN | WEIGHT: 220 LBS | HEART RATE: 72 BPM

## 2019-05-15 RX ORDER — IBUPROFEN 600 MG/1
600 TABLET ORAL EVERY 6 HOURS PRN
Qty: 30 TABLET | Refills: 0 | Status: SHIPPED | OUTPATIENT
Start: 2019-05-15 | End: 2020-10-09

## 2019-05-15 RX ADMIN — IBUPROFEN 600 MG: 600 TABLET ORAL at 12:04

## 2019-05-15 RX ADMIN — DOCUSATE SODIUM 100 MG: 100 CAPSULE, LIQUID FILLED ORAL at 09:02

## 2019-05-15 RX ADMIN — Medication 1 APPLICATION: at 12:09

## 2019-05-15 RX ADMIN — Medication: at 09:02

## 2019-05-15 RX ADMIN — IBUPROFEN 600 MG: 600 TABLET ORAL at 06:48

## 2019-05-15 NOTE — DISCHARGE SUMMARY
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 female  at 40w4d status post vaginal delivery without complication. Postpartum the patient did well. She remained afebrile, with vital signs stable. She was ready for discharge on postpartum day 2.     Infant:   female  fetus 4255 g (9 lb 6.1 oz)  with Apgar scores of 8  , 9   at five minutes.    Condition on Discharge:  Stable    Vital Signs  Temp:  [98.2 °F (36.8 °C)-98.4 °F (36.9 °C)] 98.4 °F (36.9 °C)  Heart Rate:  [71-76] 72  Resp:  [16] 16  BP: (106-113)/(60-71) 106/60    Lab Results   Component Value Date    WBC 11.57 (H) 2019    HGB 10.4 (L) 2019    HCT 30.3 (L) 2019    MCV 87.6 2019     2019       Discharge Disposition  Home or Self Care    Discharge Medications     Discharge Medications      Continue These Medications      Instructions Start Date   ibuprofen 600 MG tablet  Commonly known as:  ADVIL,MOTRIN   600 mg, Oral, Every 6 Hours PRN      Prenatal 27-1 27-1 MG tablet tablet   1 tablet, Oral, Daily      PROBIOTIC DAILY PO   2 capsules, Oral, Daily         Stop These Medications    oxyCODONE-acetaminophen 5-325 MG per tablet  Commonly known as:  PERCOCET            Discharge Diet:     Activity at Discharge:   Activity  Instructions     Activity as Tolerated            Follow-up Appointments  No future appointments.  Additional Instructions for the Follow-ups that You Need to Schedule     Call MD With Problems / Concerns   As directed      Discharge Follow-up with Specified Provider: Appointment with / [place] office in 6 weeks; 6 Weeks   As directed      To:  Appointment with / [place] office in 6 weeks    Follow Up:  6 Weeks           No driving restrictions. Rx Motrin at time of dsicharge.  May shower.  May return to work in 6 weeks.     Sienna Vang, APRN  05/15/19  9:37 AM  Csd

## 2019-05-15 NOTE — PLAN OF CARE
Problem: Patient Care Overview  Goal: Plan of Care Review  Outcome: Ongoing (interventions implemented as appropriate)   05/15/19 0607   Coping/Psychosocial   Plan of Care Reviewed With patient   Plan of Care Review   Progress improving   OTHER   Outcome Summary pain controlled well, voiding, light bleeding, V/S WDL

## 2019-05-15 NOTE — PAYOR COMM NOTE
"Reina Albert (34 y.o. Female)     Auth#[id number]    Discharged home 5/15/19 with Baby.    From: America Tavares  #[phone]  Fax#[phone]      Date of Birth Social Security Number Address Home Phone MRN    1985  [address] [facility] [address] [facility] [phone] [id number]    Alevism Marital Status          None        Admission Date Admission Type Admitting Provider Attending Provider Department, Room/Bed    5/10/19 Elective Jessica Buenrostro MD  [facility] MOTHER BABY 4B, N442/1    Discharge Date Discharge Disposition Discharge Destination        5/15/2019 Home or Self Care              Attending Provider:  (none)   Allergies:  No Known Allergies    Isolation:  None   Infection:  None   Code Status:  CPR    Ht:  165.1 cm (65\")   Wt:  99.8 kg (220 lb)    Admission Cmt:  None   Principal Problem:  None                Active Insurance as of 5/10/2019     Primary Coverage     Payor Plan Insurance Group Employer/Plan Group    [employer] P[employer] [id number]     Payor Plan Address Payor Plan Phone Number Payor Plan Fax Number Effective Dates    [address] [phone]  1/1/2017 - None Entered    [facility] [id number]       Subscriber Name Subscriber Birth Date Member ID       RIKKI ALBERT 1985 [id number]                 Emergency Contacts      (Rel.) Home Phone Work Phone Mobile Phone    Rikki Albert (Spouse) [phone] -- --               Discharge Summary      Sienna Vang APRN at 5/15/2019  9:37 AM          Discharge Summary    Date of Admission: 5/10/2019  Date of Discharge:  5/15/2019      Patient: Reina Albert      MR#:[id number]    Delivery Provider: Jessica Buenrostro     Discharge Surgeon/OB:    Presenting Problem/History of Present Illness  Pregnant [Z34.90]     Patient Active Problem List   Diagnosis   • Advanced maternal age, primigravida, antepartum   • Chromosomal " abnormality in fetus, affecting management of mother, antepartum   • Fetal hydrops   • IUFD at less than 20 weeks of gestation   • Antepartum fetal death   • Pregnant         Discharge Diagnosis: Vaginal delivery at 40w4d    Procedures:  Vaginal, Spontaneous     2019    1:18 AM        Discharge Date: 5/15/2019;     Hospital Course  Patient is a 34 y.o. female  at 40w4d status post vaginal delivery without complication. Postpartum the patient did well. She remained afebrile, with vital signs stable. She was ready for discharge on postpartum day 2.     Infant:   female  fetus 4255 g (9 lb 6.1 oz)  with Apgar scores of 8  , 9   at five minutes.    Condition on Discharge:  Stable    Vital Signs  Temp:  [98.2 °F (36.8 °C)-98.4 °F (36.9 °C)] 98.4 °F (36.9 °C)  Heart Rate:  [71-76] 72  Resp:  [16] 16  BP: (106-113)/(60-71) 106/60    Lab Results   Component Value Date    WBC 11.57 (H) 2019    HGB 10.4 (L) 2019    HCT 30.3 (L) 2019    MCV 87.6 2019     2019       Discharge Disposition  Home or Self Care    Discharge Medications     Discharge Medications      Continue These Medications      Instructions Start Date   ibuprofen 600 MG tablet  Commonly known as:  ADVIL,MOTRIN   600 mg, Oral, Every 6 Hours PRN      Prenatal 27-1 27-1 MG tablet tablet   1 tablet, Oral, Daily      PROBIOTIC DAILY PO   2 capsules, Oral, Daily         Stop These Medications    oxyCODONE-acetaminophen 5-325 MG per tablet  Commonly known as:  PERCOCET            Discharge Diet:     Activity at Discharge:   Activity Instructions     Activity as Tolerated            Follow-up Appointments  No future appointments.  Additional Instructions for the Follow-ups that You Need to Schedule     Call MD With Problems / Concerns   As directed      Discharge Follow-up with Specified Provider: Appointment with / [place] office in 6 weeks; 6 Weeks   As directed      To:  Appointment with /  [place] office in 6 weeks    Follow Up:  6 Weeks           No driving restrictions. Rx Motrin at time of dsicharge.  May shower.  May return to work in 6 weeks.     SWETHA Bolden  05/15/19  9:37 AM  Csd            Electronically signed by Sienna Vang APRN at 5/15/2019  9:38 AM

## 2019-05-15 NOTE — DISCHARGE INSTR - APPOINTMENTS
Keep follow-up appointment with Dr. Buenrostro on Friday 6/21/19 at 11:30 am at the [facility].

## 2020-09-28 ENCOUNTER — INITIAL PRENATAL (OUTPATIENT)
Dept: OBSTETRICS AND GYNECOLOGY | Facility: CLINIC | Age: 35
End: 2020-09-28

## 2020-09-28 VITALS — DIASTOLIC BLOOD PRESSURE: 76 MMHG | SYSTOLIC BLOOD PRESSURE: 116 MMHG

## 2020-09-28 DIAGNOSIS — O09.521 MULTIGRAVIDA OF ADVANCED MATERNAL AGE IN FIRST TRIMESTER: ICD-10-CM

## 2020-09-28 DIAGNOSIS — O02.0 BLIGHTED OVUM: Primary | ICD-10-CM

## 2020-09-28 PROBLEM — Z34.90 PREGNANT: Status: RESOLVED | Noted: 2019-05-10 | Resolved: 2020-09-28

## 2020-09-28 PROBLEM — O02.1 IUFD AT LESS THAN 20 WEEKS OF GESTATION: Status: RESOLVED | Noted: 2017-12-23 | Resolved: 2020-09-28

## 2020-09-28 PROBLEM — O36.4XX0: Status: RESOLVED | Noted: 2017-12-23 | Resolved: 2020-09-28

## 2020-09-28 PROBLEM — O09.519 ADVANCED MATERNAL AGE, PRIMIGRAVIDA, ANTEPARTUM: Status: RESOLVED | Noted: 2017-12-19 | Resolved: 2020-09-28

## 2020-09-28 PROBLEM — O35.10X0 CHROMOSOMAL ABNORMALITY IN FETUS, AFFECTING MANAGEMENT OF MOTHER, ANTEPARTUM: Status: RESOLVED | Noted: 2017-12-19 | Resolved: 2020-09-28

## 2020-09-28 PROCEDURE — 99214 OFFICE O/P EST MOD 30 MIN: CPT | Performed by: OBSTETRICS & GYNECOLOGY

## 2020-10-09 ENCOUNTER — ROUTINE PRENATAL (OUTPATIENT)
Dept: OBSTETRICS AND GYNECOLOGY | Facility: CLINIC | Age: 35
End: 2020-10-09

## 2020-10-09 VITALS — DIASTOLIC BLOOD PRESSURE: 80 MMHG | SYSTOLIC BLOOD PRESSURE: 120 MMHG | BODY MASS INDEX: 33.78 KG/M2 | WEIGHT: 203 LBS

## 2020-10-09 DIAGNOSIS — O09.521 MULTIGRAVIDA OF ADVANCED MATERNAL AGE IN FIRST TRIMESTER: Primary | ICD-10-CM

## 2020-10-09 DIAGNOSIS — O02.0 BLIGHTED OVUM: ICD-10-CM

## 2020-10-09 PROCEDURE — 99213 OFFICE O/P EST LOW 20 MIN: CPT | Performed by: OBSTETRICS & GYNECOLOGY

## 2020-10-09 RX ORDER — MISOPROSTOL 200 UG/1
TABLET ORAL
Qty: 4 TABLET | Refills: 1 | Status: SHIPPED | OUTPATIENT
Start: 2020-10-09 | End: 2021-03-05

## 2020-10-09 NOTE — PROGRESS NOTES
Chief Complaint   Patient presents with   • Follow-up          HPI  Reina Benitez is a 35 y.o. female, , who presents with U/S with gestational sac only.    Recent Tests:  She had a urine pregnancy test that was done 3 weeks ago that was positive..  She had a pelvic ultrasound today and the findings were gestational sac only..  She has had prenatal care.  She complains of cramping pain.  The pain is located in her lower abdomen.. Her past medical history is notable for spontaneous .  She does not have passage of tissue.  Rh Status: Positive  She reports no additional symptoms or complaints.    The additional following portions of the patient's history were reviewed and updated as appropriate: allergies, current medications, past family history, past medical history, past social history, past surgical history and problem list.    Review of Systems  Constitutional POS: nothing reported    NEG: chills or fevers   Genitourinary POS: nothing reported    NEG: dysuria or hematuria   Gastointestinal POS: nothing reported    NEG: abdominal pain, constipation or reflux symptoms   Integument POS: nothing reported    NEG: moles that are changing in size, shape, color or rashes   All other systems reviewed and are negative.     I have reviewed and agree with the HPI, ROS, and historical information as entered above. Jessica Buenrostro MD    Objective   Physical Exam  /80   Wt 92.1 kg (203 lb)   LMP 2020   BMI 33.78 kg/m²     General:  well developed; well nourished  no acute distress   Skin:  No suspicious lesions seen   Lungs:  Not performed.  breathing is unlabored   Heart:  Not performed.   Abdomen: soft, non-tender; no masses  no umbilical or inguinal hernias are present   Pelvis: Not performed.        Assessment and Plan    Problem List Items Addressed This Visit     Multigravida of advanced maternal age in first trimester - Primary    Blighted ovum          1. Blighted Ovum  2. Repeat U/S  in 1 week(s).  3. Options discussed with patient.  4. Cytotec prescribed.  Bleeding precautions reviewed.  5.   RH positive.      Jessica Buenrostro MD  10/09/2020

## 2020-10-16 ENCOUNTER — ROUTINE PRENATAL (OUTPATIENT)
Dept: OBSTETRICS AND GYNECOLOGY | Facility: CLINIC | Age: 35
End: 2020-10-16

## 2020-10-16 VITALS — DIASTOLIC BLOOD PRESSURE: 82 MMHG | BODY MASS INDEX: 34.61 KG/M2 | SYSTOLIC BLOOD PRESSURE: 122 MMHG | WEIGHT: 208 LBS

## 2020-10-16 DIAGNOSIS — O03.4 INCOMPLETE ABORTION: Primary | ICD-10-CM

## 2020-10-16 PROCEDURE — 99213 OFFICE O/P EST LOW 20 MIN: CPT | Performed by: OBSTETRICS & GYNECOLOGY

## 2020-10-16 NOTE — PROGRESS NOTES
No chief complaint on file.         HPI  Reina Benitez is a 35 y.o. female, , who presents with follow up ultrasound.    Recent Tests:  She had a pelvic ultrasound today and the findings were no evidence of pregnancy..  She has had prenatal care.  She complains of cramping pain.  The pain is located in her lower abdomen.. Her past medical history is non-contributory.  She has passage of tissue.  Rh Status: Positive  She reports no additional symptoms or complaints.    The additional following portions of the patient's history were reviewed and updated as appropriate: allergies, current medications, past family history, past medical history, past social history, past surgical history and problem list.    Review of Systems  Constitutional POS: nothing reported    NEG: chills or fevers   Genitourinary POS: nothing reported    NEG: dysuria or hematuria   Gastointestinal POS: nothing reported    NEG: abdominal pain, constipation or reflux symptoms   Integument POS: nothing reported    NEG: moles that are changing in size, shape, color or rashes   All other systems reviewed and are negative.     I have reviewed and agree with the HPI, ROS, and historical information as entered above. Jessica Buenrostro MD    Objective   Physical Exam  /82   Wt 94.3 kg (208 lb)   LMP 2020   BMI 34.61 kg/m²     General:  well developed; well nourished  no acute distress   Skin:  No suspicious lesions seen   Lungs:  breathing is unlabored   Heart:  Not performed.   Abdomen: soft, non-tender; no masses  no umbilical or inguinal hernias are present   Pelvis: Not performed.        Assessment and Plan    Problem List Items Addressed This Visit     Incomplete  - Primary    Relevant Orders    HCG, B-subunit, Quantitative    HCG, B-subunit, Quantitative          1. Incomplete Ab  2. Pelvic Rest.  No douching, intercourse or use of tampons.  3. Call for an increase in bleeding, abdominal pain, or fever.  4. Will  draw HCG today and in 1 week to be sure decreasing appropriately.  Reviewed small amt likely clot in uterus.   5.   Followup for AE first of year.    Counseling was given to patient for the following topics: diagnostic results, instructions for management and prognosis .       Jessica Bunerostro MD  10/16/2020

## 2020-10-18 PROBLEM — O03.4 INCOMPLETE ABORTION: Status: ACTIVE | Noted: 2020-10-18

## 2020-10-23 ENCOUNTER — RESULTS ENCOUNTER (OUTPATIENT)
Dept: OBSTETRICS AND GYNECOLOGY | Facility: CLINIC | Age: 35
End: 2020-10-23

## 2020-10-23 DIAGNOSIS — O03.4 INCOMPLETE ABORTION: ICD-10-CM

## 2021-03-05 ENCOUNTER — INITIAL PRENATAL (OUTPATIENT)
Dept: OBSTETRICS AND GYNECOLOGY | Facility: CLINIC | Age: 36
End: 2021-03-05

## 2021-03-05 VITALS — WEIGHT: 208 LBS | BODY MASS INDEX: 34.61 KG/M2 | DIASTOLIC BLOOD PRESSURE: 80 MMHG | SYSTOLIC BLOOD PRESSURE: 120 MMHG

## 2021-03-05 DIAGNOSIS — O09.521 MULTIGRAVIDA OF ADVANCED MATERNAL AGE IN FIRST TRIMESTER: ICD-10-CM

## 2021-03-05 DIAGNOSIS — Z3A.10 10 WEEKS GESTATION OF PREGNANCY: Primary | ICD-10-CM

## 2021-03-05 PROBLEM — O02.0 BLIGHTED OVUM: Status: RESOLVED | Noted: 2020-10-09 | Resolved: 2021-03-05

## 2021-03-05 PROBLEM — O03.4 INCOMPLETE ABORTION: Status: RESOLVED | Noted: 2020-10-18 | Resolved: 2021-03-05

## 2021-03-05 PROCEDURE — 0501F PRENATAL FLOW SHEET: CPT | Performed by: OBSTETRICS & GYNECOLOGY

## 2021-03-05 RX ORDER — PNV NO.95/FERROUS FUM/FOLIC AC 28MG-0.8MG
TABLET ORAL
COMMUNITY

## 2021-03-05 NOTE — PROGRESS NOTES
Initial ob visit     CC- Here for care of pregnancy        Reina Benitez is a 35 y.o. female, , who presents for her first obstetrical visit.  Her last LMP was Patient's last menstrual period was 2020..    OB History    Para Term  AB Living   4 2 1 0 0 1   SAB TAB Ectopic Molar Multiple Live Births   0 0 0 0 0 1      # Outcome Date GA Lbr Aaron/2nd Weight Sex Delivery Anes PTL Lv   4 Current            3 Term 19 40w4d 38:50 / 03:58 4255 g (9 lb 6.1 oz) F Vag-Spont EPI N PAULINE   2 Para 17 14w5d    Vag-Spont None  FD   1                 Initial positive test date : 2021, UPT          Prior obstetric issues, potential pregnancy concerns: None  Family history of genetic issues (includes FOB): None  Prior infections concerning in pregnancy (Rash, fever in last 2 weeks): Negative  Varicella Hx - Yes  Prior testing for Cystic Fibrosis Carrier or Sickle Cell Trait- Daughter + for sickle cell trait  Prepregnancy BMI - Body mass index is 34.61 kg/m².  History of STD: no    Additional Pertinent History   Last Pap : 2019   Last Completed Pap Smear       Status Date      PAP SMEAR No completions recorded        History of abnormal Pap smear: no  Family history of uterine, colon, breast, or ovarian cancer: yes - Breast Cancer PGM  Performs monthly Self-Breast Exam: yes  Exercises Regularly: no  Feelings of Anxiety or Depression: no  Tobacco Usage?: No   Alcohol/Drug Use?: NO  Over the age of 35 at delivery: yes  Desires Genetic Screening: Undecided    PMH  Past Medical History:   Diagnosis Date   • ADHD    • Bipolar disorder (CMS/HCC)    • Depression        Current Outpatient Medications:   •  Prenatal Vit-Fe Fumarate-FA (Prenatal Vitamin) 27-0.8 MG tablet, Take  by mouth., Disp: , Rfl:   •  Probiotic Product (PROBIOTIC PO), Take  by mouth., Disp: , Rfl:     The additional following portions of the patient's history were reviewed and updated as appropriate: allergies,  current medications, past family history, past medical history, past social history, past surgical history and problem list.    Review of Systems   Review of Systems  Current obstetric complaints : Nausea and Vomiting   All systems reviewed and otherwise normal.    I have reviewed and agree with the HPI, ROS, and historical information as entered above. Jessica Buenrostro MD    /80   Wt 94.3 kg (208 lb)   LMP 12/24/2020   Breastfeeding Unknown   BMI 34.61 kg/m²     Physical Exam  General Appearance:    Alert, cooperative, in no acute distress,    Head:    Normocephalic, without obvious abnormality, atraumatic   Neck:   No adenopathy, supple, trachea midline, no thyromegaly   Back:     No kyphosis present, no scoliosis present,                       Lungs:     Clear to auscultation,respirations regular, even and     unlabored           Abdomen:     Normal bowel sounds, no masses, no organomegaly, soft        non-tender, non-distended, no guarding, no rebound                 tenderness       Extremities:   Moves all extremities well, no edema, no cyanosis, no         redness   Pulses:   Pulses palpable and equal bilaterally   Skin:   No bleeding, bruising or rash   Lymph nodes:   No palpable adenopathy   Neurologic:   Sensation intact, A&O times 3      Physical Exam has been reviewed and confirmed by Jessica Buenrostro MD    Assessment/Plan   Assessment     Problem List Items Addressed This Visit     Multigravida of advanced maternal age in first trimester    10 weeks gestation of pregnancy - Primary    Relevant Orders    Obstetric Panel    HIV-1 / O / 2 Ag / Antibody 4th Generation    Urinalysis With Microscopic - Urine, Clean Catch    Urine Culture - Urine, Urine, Clean Catch    Urine Drug Screen - Urine, Clean Catch          1. Pregnancy at 10w1d  2. AMA  3. H/o sAb x 2, h/o monosomy X miscarriage with G1    Plan     Lab(s) Ordered  Discussed options for genetic testing including first trimester nuchal  translucency screen, genetic disease carrier testing, quadruple screen, and [place].  Nausea/Vomiting - she does not desire medications at this time.  Discussed conservative ways to help with nausea.  Patient is on Prenatal vitamins  Activity recommendation : 150 minutes/week of moderate intensity aerobic activity unless we limit for bleeding, hypertension or other pregnancy complication   Follow Up: Return in about 4 weeks (around 4/2/2021) for Next scheduled follow up.  Will do Panorama today        Jessica Buenrostro MD  03/05/2021

## 2021-03-07 LAB
ABO GROUP BLD: NORMAL
AMPHETAMINES UR QL SCN: NEGATIVE NG/ML
APPEARANCE UR: CLEAR
BACTERIA #/AREA URNS HPF: NORMAL /[HPF]
BACTERIA UR CULT: NO GROWTH
BACTERIA UR CULT: NORMAL
BARBITURATES UR QL SCN: NEGATIVE NG/ML
BASOPHILS # BLD AUTO: 0 X10E3/UL (ref 0–0.2)
BASOPHILS NFR BLD AUTO: 0 %
BENZODIAZ UR QL SCN: NEGATIVE NG/ML
BILIRUB UR QL STRIP: NEGATIVE
BLD GP AB SCN SERPL QL: NEGATIVE
BZE UR QL SCN: NEGATIVE NG/ML
CANNABINOIDS UR QL SCN: NEGATIVE NG/ML
COLOR UR: YELLOW
CREAT UR-MCNC: 51.6 MG/DL (ref 20–300)
EOSINOPHIL # BLD AUTO: 0 X10E3/UL (ref 0–0.4)
EOSINOPHIL NFR BLD AUTO: 0 %
EPI CELLS #/AREA URNS HPF: NORMAL /HPF (ref 0–10)
ERYTHROCYTE [DISTWIDTH] IN BLOOD BY AUTOMATED COUNT: 14.3 % (ref 11.7–15.4)
GLUCOSE UR QL: NEGATIVE
HBV SURFACE AG SERPL QL IA: NEGATIVE
HCT VFR BLD AUTO: 39.8 % (ref 34–46.6)
HCV AB S/CO SERPL IA: <0.1 S/CO RATIO (ref 0–0.9)
HGB BLD-MCNC: 13.6 G/DL (ref 11.1–15.9)
HGB UR QL STRIP: NEGATIVE
HIV 1+2 AB+HIV1 P24 AG SERPL QL IA: NON REACTIVE
IMM GRANULOCYTES # BLD AUTO: 0 X10E3/UL (ref 0–0.1)
IMM GRANULOCYTES NFR BLD AUTO: 0 %
KETONES UR QL STRIP: ABNORMAL
LABORATORY COMMENT REPORT: NORMAL
LEUKOCYTE ESTERASE UR QL STRIP: NEGATIVE
LYMPHOCYTES # BLD AUTO: 1.6 X10E3/UL (ref 0.7–3.1)
LYMPHOCYTES NFR BLD AUTO: 20 %
MCH RBC QN AUTO: 31.1 PG (ref 26.6–33)
MCHC RBC AUTO-ENTMCNC: 34.2 G/DL (ref 31.5–35.7)
MCV RBC AUTO: 91 FL (ref 79–97)
METHADONE UR QL SCN: NEGATIVE NG/ML
MICRO URNS: ABNORMAL
MICRO URNS: ABNORMAL
MONOCYTES # BLD AUTO: 0.5 X10E3/UL (ref 0.1–0.9)
MONOCYTES NFR BLD AUTO: 7 %
NEUTROPHILS # BLD AUTO: 5.9 X10E3/UL (ref 1.4–7)
NEUTROPHILS NFR BLD AUTO: 73 %
NITRITE UR QL STRIP: NEGATIVE
OPIATES UR QL SCN: NEGATIVE NG/ML
OXYCODONE+OXYMORPHONE UR QL SCN: NEGATIVE NG/ML
PCP UR QL: NEGATIVE NG/ML
PH UR STRIP: 7.5 [PH] (ref 5–7.5)
PH UR: 7.4 [PH] (ref 4.5–8.9)
PLATELET # BLD AUTO: 314 X10E3/UL (ref 150–450)
PROPOXYPH UR QL SCN: NEGATIVE NG/ML
PROT UR QL STRIP: NEGATIVE
RBC # BLD AUTO: 4.38 X10E6/UL (ref 3.77–5.28)
RBC #/AREA URNS HPF: NORMAL /HPF (ref 0–2)
RH BLD: POSITIVE
RPR SER QL: NON REACTIVE
RUBV IGG SERPL IA-ACNC: 4.38 INDEX
SP GR UR: 1.01 (ref 1–1.03)
UROBILINOGEN UR STRIP-MCNC: 0.2 MG/DL (ref 0.2–1)
WBC # BLD AUTO: 8.1 X10E3/UL (ref 3.4–10.8)
WBC #/AREA URNS HPF: NORMAL /HPF (ref 0–5)

## 2021-03-17 DIAGNOSIS — Z3A.10 10 WEEKS GESTATION OF PREGNANCY: Primary | ICD-10-CM

## 2021-04-09 ENCOUNTER — ROUTINE PRENATAL (OUTPATIENT)
Dept: OBSTETRICS AND GYNECOLOGY | Facility: CLINIC | Age: 36
End: 2021-04-09

## 2021-04-09 VITALS — WEIGHT: 207 LBS | DIASTOLIC BLOOD PRESSURE: 78 MMHG | SYSTOLIC BLOOD PRESSURE: 116 MMHG | BODY MASS INDEX: 34.45 KG/M2

## 2021-04-09 DIAGNOSIS — Z3A.15 15 WEEKS GESTATION OF PREGNANCY: Primary | ICD-10-CM

## 2021-04-09 LAB
EXPIRATION DATE: NORMAL
GLUCOSE UR STRIP-MCNC: NEGATIVE MG/DL
Lab: NORMAL
PROT UR STRIP-MCNC: NEGATIVE MG/DL

## 2021-04-09 PROCEDURE — 0502F SUBSEQUENT PRENATAL CARE: CPT | Performed by: OBSTETRICS & GYNECOLOGY

## 2021-04-09 NOTE — PROGRESS NOTES
OB FOLLOW UP    Reina Benitez is a 35 y.o.  15w1d patient being seen today for her obstetrical follow up visit. Patient reports nausea, no bleeding, no contractions, no cramping, no leaking and vomiting.     Her prenatal care is complicated by (and status) :    Patient Active Problem List   Diagnosis   • Multigravida of advanced maternal age in first trimester   • 10 weeks gestation of pregnancy   • 15 weeks gestation of pregnancy       ROS -   Patient Reports : nausea and occasional vomiting  Patient Denies: Loss of Fluid, Vaginal Spotting, Vision Changes, Headaches and Cramping/Contractions   Fetal Movement : Has not started feeling movements      /78   Wt 93.9 kg (207 lb)   LMP 2020   BMI 34.45 kg/m²     Ultrasound Today: no    EXAM:  Vitals: See prenatal flowsheet   Abdomen: See prenatal flowsheet   Urine glucose/protein: See prenatal flowsheet   Pelvic: See prenatal flowsheet     Assessment    1. Pregnancy at 15w1d  2. Fetal status reassuring     Problem List Items Addressed This Visit     15 weeks gestation of pregnancy - Primary    Relevant Orders    POC Protein, Urine, Qualitative, Dipstick (Completed)    POC Glucose, Urine, Qualitative, Dipstick (Completed)    US Ob 14 + Weeks Single or First Gestation          Plan    1. Fetal movement/ Labor Precautions  2. Discussed msAFP  Follow Up: Return in about 5 weeks (around 2021) for WITH SONO.  Diet/exercise precautions        Jessica Buenrostro MD  2021

## 2021-04-16 PROBLEM — Z3A.15 15 WEEKS GESTATION OF PREGNANCY: Status: ACTIVE | Noted: 2021-04-16

## 2021-05-21 ENCOUNTER — ROUTINE PRENATAL (OUTPATIENT)
Dept: OBSTETRICS AND GYNECOLOGY | Facility: CLINIC | Age: 36
End: 2021-05-21

## 2021-05-21 VITALS — WEIGHT: 213 LBS | BODY MASS INDEX: 35.45 KG/M2 | SYSTOLIC BLOOD PRESSURE: 110 MMHG | DIASTOLIC BLOOD PRESSURE: 74 MMHG

## 2021-05-21 DIAGNOSIS — O09.521 MULTIGRAVIDA OF ADVANCED MATERNAL AGE IN FIRST TRIMESTER: ICD-10-CM

## 2021-05-21 DIAGNOSIS — Z3A.21 21 WEEKS GESTATION OF PREGNANCY: Primary | ICD-10-CM

## 2021-05-21 LAB
GLUCOSE UR STRIP-MCNC: NEGATIVE MG/DL
PROT UR STRIP-MCNC: NEGATIVE MG/DL

## 2021-05-21 PROCEDURE — 0502F SUBSEQUENT PRENATAL CARE: CPT | Performed by: OBSTETRICS & GYNECOLOGY

## 2021-05-28 PROBLEM — Z3A.21 21 WEEKS GESTATION OF PREGNANCY: Status: ACTIVE | Noted: 2021-05-28

## 2021-06-18 ENCOUNTER — ROUTINE PRENATAL (OUTPATIENT)
Dept: OBSTETRICS AND GYNECOLOGY | Facility: CLINIC | Age: 36
End: 2021-06-18

## 2021-06-18 VITALS — SYSTOLIC BLOOD PRESSURE: 110 MMHG | DIASTOLIC BLOOD PRESSURE: 72 MMHG | WEIGHT: 217 LBS | BODY MASS INDEX: 36.11 KG/M2

## 2021-06-18 DIAGNOSIS — O09.522 MULTIGRAVIDA OF ADVANCED MATERNAL AGE IN SECOND TRIMESTER: ICD-10-CM

## 2021-06-18 DIAGNOSIS — Z3A.25 25 WEEKS GESTATION OF PREGNANCY: Primary | ICD-10-CM

## 2021-06-18 LAB
GLUCOSE UR STRIP-MCNC: NEGATIVE MG/DL
PROT UR STRIP-MCNC: NEGATIVE MG/DL

## 2021-06-18 PROCEDURE — 0502F SUBSEQUENT PRENATAL CARE: CPT | Performed by: OBSTETRICS & GYNECOLOGY

## 2021-06-18 NOTE — PROGRESS NOTES
OB FOLLOW UP    Reina Benitez is a 36 y.o.  25w1d patient being seen today for her obstetrical follow up visit. Patient reports no complaints. 28wk instr given/A+ feels well, good fm,    Her prenatal care is complicated by (and status) :    Patient Active Problem List   Diagnosis   • Multigravida of advanced maternal age in second trimester   • 25 weeks gestation of pregnancy       ROS -   Patient Reports : No Problems  Patient Denies: Loss of Fluid, Vaginal Spotting, Vision Changes, Cramping/Contractions  and No Problems  Fetal Movement : normal      /72   Wt 98.4 kg (217 lb)   LMP 2020   BMI 36.11 kg/m²     Ultrasound Today: yes    EXAM:  Vitals: See prenatal flowsheet   Abdomen: See prenatal flowsheet   Urine glucose/protein: See prenatal flowsheet   Pelvic: See prenatal flowsheet     Assessment    1. 25 weeks gestation of pregnancy (Primary)  -     POC Urinalysis Dipstick, Automated        1. Pregnancy at 25w1d  2. Fetal status reassuring     Problem List Items Addressed This Visit     Multigravida of advanced maternal age in second trimester    Current Assessment & Plan     H/O baby with turners, IUFD  cfDNA testing low risk this pregnancy         25 weeks gestation of pregnancy - Primary    Relevant Orders    POC Urinalysis Dipstick, Automated (Completed)          Plan    1. Fetal movement/ Labor Precautions  2. Reviewed f/u heart views normal  3.  Follow Up: Return in about 4 weeks (around 2021) for Next scheduled follow up.        Jessica Buenrostro MD  2021

## 2021-06-22 PROBLEM — Z3A.21 21 WEEKS GESTATION OF PREGNANCY: Status: RESOLVED | Noted: 2021-05-28 | Resolved: 2021-06-22

## 2021-06-22 PROBLEM — Z3A.10 10 WEEKS GESTATION OF PREGNANCY: Status: RESOLVED | Noted: 2021-03-05 | Resolved: 2021-06-22

## 2021-06-22 PROBLEM — Z3A.25 25 WEEKS GESTATION OF PREGNANCY: Status: ACTIVE | Noted: 2021-06-22

## 2021-06-22 PROBLEM — O09.522 MULTIGRAVIDA OF ADVANCED MATERNAL AGE IN SECOND TRIMESTER: Status: ACTIVE | Noted: 2020-09-28

## 2021-06-22 PROBLEM — Z3A.15 15 WEEKS GESTATION OF PREGNANCY: Status: RESOLVED | Noted: 2021-04-16 | Resolved: 2021-06-22

## 2021-07-16 ENCOUNTER — ROUTINE PRENATAL (OUTPATIENT)
Dept: OBSTETRICS AND GYNECOLOGY | Facility: CLINIC | Age: 36
End: 2021-07-16

## 2021-07-16 VITALS — SYSTOLIC BLOOD PRESSURE: 124 MMHG | WEIGHT: 217.4 LBS | DIASTOLIC BLOOD PRESSURE: 78 MMHG | BODY MASS INDEX: 36.18 KG/M2

## 2021-07-16 DIAGNOSIS — Z34.90 PREGNANCY, UNSPECIFIED GESTATIONAL AGE: ICD-10-CM

## 2021-07-16 DIAGNOSIS — O09.522 MULTIGRAVIDA OF ADVANCED MATERNAL AGE IN SECOND TRIMESTER: ICD-10-CM

## 2021-07-16 DIAGNOSIS — Z3A.29 29 WEEKS GESTATION OF PREGNANCY: Primary | ICD-10-CM

## 2021-07-16 LAB
EXPIRATION DATE: 0
GLUCOSE UR STRIP-MCNC: NEGATIVE MG/DL
Lab: 0
PROT UR STRIP-MCNC: NEGATIVE MG/DL

## 2021-07-16 PROCEDURE — 90715 TDAP VACCINE 7 YRS/> IM: CPT | Performed by: OBSTETRICS & GYNECOLOGY

## 2021-07-16 PROCEDURE — 0502F SUBSEQUENT PRENATAL CARE: CPT | Performed by: OBSTETRICS & GYNECOLOGY

## 2021-07-16 PROCEDURE — 90471 IMMUNIZATION ADMIN: CPT | Performed by: OBSTETRICS & GYNECOLOGY

## 2021-07-16 NOTE — PROGRESS NOTES
OB FOLLOW UP    Reina Benitez is a 36 y.o.  29w1d patient being seen today for her obstetrical follow up visit. Patient reports heartburn.     Her prenatal care is complicated by (and status) :    Patient Active Problem List   Diagnosis   • Multigravida of advanced maternal age in second trimester   • Pregnancy   • 29 weeks gestation of pregnancy       ROS -   Patient Reports : Heartburn  Patient Denies: Loss of Fluid, Vaginal Spotting, Vision Changes, Headaches and Cramping/Contractions   Fetal Movement : normal      /78   Wt 98.6 kg (217 lb 6.4 oz)   LMP 2020   BMI 36.18 kg/m²     Ultrasound Today: no    EXAM:  Vitals: See prenatal flowsheet   Abdomen: See prenatal flowsheet   Urine glucose/protein: See prenatal flowsheet   Pelvic: See prenatal flowsheet     Assessment    Diagnoses and all orders for this visit:    1. 29 weeks gestation of pregnancy (Primary)  -     POC Glucose, Urine, Qualitative, Dipstick  -     POC Protein, Urine, Qualitative, Dipstick  -     CBC (No Diff)  -     Antibody Screen  -     Gestational Screen 1 Hr ([facility])  -     Tdap Vaccine Greater Than or Equal To 6yo IM    2. Multigravida of advanced maternal age in second trimester    3. Pregnancy, unspecified gestational age        1. Pregnancy at 29w1d  2. Fetal status reassuring     Problem List Items Addressed This Visit     Multigravida of advanced maternal age in second trimester    Overview     Panorama within normal         Pregnancy    Relevant Orders    POC Glucose, Urine, Qualitative, Dipstick (Completed)    POC Protein, Urine, Qualitative, Dipstick (Completed)    CBC (No Diff)    Antibody Screen    Gestational Screen 1 Hr ([facility])    Tdap Vaccine Greater Than or Equal To 6yo IM (Completed)    29 weeks gestation of pregnancy - Primary    Relevant Orders    POC Glucose, Urine, Qualitative, Dipstick (Completed)    POC Protein, Urine, Qualitative, Dipstick (Completed)    CBC (No Diff)    Antibody Screen     Gestational Screen 1 Hr ([facility])    Tdap Vaccine Greater Than or Equal To 8yo IM (Completed)          Plan    1. Fetal movement/ Labor Precautions  2. Okay to take Pepcid  Diet/exercise precautions  Follow Up: Return in about 3 weeks (around 2021) for Next scheduled follow up.        Jessica Buenrostro MD  2021

## 2021-07-17 LAB
BLD GP AB SCN SERPL QL: NEGATIVE
ERYTHROCYTE [DISTWIDTH] IN BLOOD BY AUTOMATED COUNT: 12.8 % (ref 12.3–15.4)
GLUCOSE 1H P 50 G GLC PO SERPL-MCNC: 166 MG/DL (ref 65–139)
HCT VFR BLD AUTO: 34.6 % (ref 34–46.6)
HGB BLD-MCNC: 11.7 G/DL (ref 12–15.9)
MCH RBC QN AUTO: 30.8 PG (ref 26.6–33)
MCHC RBC AUTO-ENTMCNC: 33.8 G/DL (ref 31.5–35.7)
MCV RBC AUTO: 91.1 FL (ref 79–97)
PLATELET # BLD AUTO: 313 10*3/MM3 (ref 140–450)
RBC # BLD AUTO: 3.8 10*6/MM3 (ref 3.77–5.28)
WBC # BLD AUTO: 7.45 10*3/MM3 (ref 3.4–10.8)

## 2021-07-19 DIAGNOSIS — Z34.82 PRENATAL CARE, SUBSEQUENT PREGNANCY, SECOND TRIMESTER: Primary | ICD-10-CM

## 2021-07-26 ENCOUNTER — LAB (OUTPATIENT)
Dept: OBSTETRICS AND GYNECOLOGY | Facility: CLINIC | Age: 36
End: 2021-07-26

## 2021-07-26 DIAGNOSIS — O99.810 ABNORMAL GLUCOSE AFFECTING PREGNANCY: ICD-10-CM

## 2021-07-26 DIAGNOSIS — Z34.90 PREGNANCY, UNSPECIFIED GESTATIONAL AGE: ICD-10-CM

## 2021-07-26 DIAGNOSIS — R73.09 ABNORMAL GLUCOSE: Primary | ICD-10-CM

## 2021-07-27 DIAGNOSIS — O24.410 DIET CONTROLLED GESTATIONAL DIABETES MELLITUS (GDM), ANTEPARTUM: Primary | ICD-10-CM

## 2021-07-27 LAB
GLUCOSE 1H P 100 G GLC PO SERPL-MCNC: 199 MG/DL (ref 65–179)
GLUCOSE 2H P 100 G GLC PO SERPL-MCNC: 195 MG/DL (ref 65–154)
GLUCOSE 3H P 100 G GLC PO SERPL-MCNC: 150 MG/DL (ref 65–139)
GLUCOSE P FAST SERPL-MCNC: 100 MG/DL (ref 65–94)

## 2021-07-27 NOTE — PROGRESS NOTES
All abnormal.  Needs diabetes counseling.  Also, I'll order a growth scan for not Friday but her next visit after that 8/13.  Please make sure it gets on U/S schedule.  Thanks!

## 2021-07-28 DIAGNOSIS — O24.410 GDM (GESTATIONAL DIABETES MELLITUS), CLASS A1: Primary | ICD-10-CM

## 2021-08-05 ENCOUNTER — HOSPITAL ENCOUNTER (OUTPATIENT)
Dept: DIABETES SERVICES | Facility: HOSPITAL | Age: 36
Setting detail: RECURRING SERIES
Discharge: HOME | End: 2021-08-05

## 2021-08-06 ENCOUNTER — ROUTINE PRENATAL (OUTPATIENT)
Dept: OBSTETRICS AND GYNECOLOGY | Facility: CLINIC | Age: 36
End: 2021-08-06

## 2021-08-06 VITALS — WEIGHT: 212.2 LBS | DIASTOLIC BLOOD PRESSURE: 82 MMHG | SYSTOLIC BLOOD PRESSURE: 126 MMHG | BODY MASS INDEX: 35.31 KG/M2

## 2021-08-06 DIAGNOSIS — O09.522 MULTIGRAVIDA OF ADVANCED MATERNAL AGE IN SECOND TRIMESTER: ICD-10-CM

## 2021-08-06 DIAGNOSIS — O24.410 GDM (GESTATIONAL DIABETES MELLITUS), CLASS A1: ICD-10-CM

## 2021-08-06 DIAGNOSIS — Z3A.32 32 WEEKS GESTATION OF PREGNANCY: Primary | ICD-10-CM

## 2021-08-06 LAB
EXPIRATION DATE: 0
GLUCOSE UR STRIP-MCNC: NEGATIVE MG/DL
Lab: 0
PROT UR STRIP-MCNC: NEGATIVE MG/DL

## 2021-08-06 PROCEDURE — 0502F SUBSEQUENT PRENATAL CARE: CPT | Performed by: NURSE PRACTITIONER

## 2021-08-06 NOTE — PROGRESS NOTES
OB FOLLOW UP  CC- Here for care of pregnancy        Reina Benitez is a 36 y.o.  32w1d patient being seen today for her obstetrical follow up visit. Patient reports nausea, vomiting and gus finley. Pt reports being newly diagnosed with GDM. Monitoring BS at home. She reports fasting BS ranging from , but mostly <100,  and 1hr after eating being 110-128. She met with diabetes counseling yesterday.     Her prenatal care is complicated by (and status) :    Patient Active Problem List   Diagnosis   • Multigravida of advanced maternal age in second trimester   • Pregnancy   • 29 weeks gestation of pregnancy   • GDM (gestational diabetes mellitus), class A1       Flu Status: n/a  Ultrasound Today: No.    ROS -   Patient Reports : Nausea, Vomiting. She reports vomiting 1-2 times per week and Contractions  Patient Denies: Loss of Fluid, Vaginal Spotting, Vision Changes, Headaches and Epigastric pain  Fetal Movement : normal  All other systems reviewed and are negative.       The additional following portions of the patient's history were reviewed and updated as appropriate: allergies, current medications, past family history, past medical history, past social history, past surgical history and problem list.    I have reviewed and agree with the HPI, ROS, and historical information as entered above. Yelitza Williamson, APRN    /82   Wt 96.3 kg (212 lb 3.2 oz)   LMP 2020   BMI 35.31 kg/m²       EXAM:     FHT: pos BPM     Urine glucose/protein: See prenatal flowsheet       Assessment and Plan    Problem List Items Addressed This Visit        Endocrine and Metabolic    GDM (gestational diabetes mellitus), class A1    Overview     Needs Diabetes counseling  Growth 32wk, NSTs weekly thereafter            Gravid and     Multigravida of advanced maternal age in second trimester    Overview     Panorama within normal         Pregnancy - Primary    Relevant Orders    POC Glucose, Urine,  Qualitative, Dipstick (Completed)    POC Protein, Urine, Qualitative, Dipstick (Completed)          1. Pregnancy at 32w1d  2. Fetal status reassuring.   3. GDM- continue to monitor BS's and watch diet. Advised to call with persistent fasting BS's >100.   Return in about 1 week (around 8/13/2021) for with growth U/S, BPP .    Yelitza Williamson, SWETHA  08/06/2021

## 2021-08-11 ENCOUNTER — HOSPITAL ENCOUNTER (OUTPATIENT)
Facility: HOSPITAL | Age: 36
Discharge: HOME | End: 2021-08-12
Attending: OBSTETRICS & GYNECOLOGY | Admitting: OBSTETRICS & GYNECOLOGY

## 2021-08-11 ENCOUNTER — ROUTINE PRENATAL (OUTPATIENT)
Dept: OBSTETRICS AND GYNECOLOGY | Facility: CLINIC | Age: 36
End: 2021-08-11

## 2021-08-11 ENCOUNTER — TELEPHONE (OUTPATIENT)
Dept: OBSTETRICS AND GYNECOLOGY | Facility: CLINIC | Age: 36
End: 2021-08-11

## 2021-08-11 VITALS — WEIGHT: 203 LBS | BODY MASS INDEX: 33.78 KG/M2 | SYSTOLIC BLOOD PRESSURE: 110 MMHG | DIASTOLIC BLOOD PRESSURE: 70 MMHG

## 2021-08-11 DIAGNOSIS — O09.522 MULTIGRAVIDA OF ADVANCED MATERNAL AGE IN SECOND TRIMESTER: ICD-10-CM

## 2021-08-11 DIAGNOSIS — Z3A.36 36 WEEKS GESTATION OF PREGNANCY: Primary | ICD-10-CM

## 2021-08-11 DIAGNOSIS — R11.2 INTRACTABLE VOMITING WITH NAUSEA, UNSPECIFIED VOMITING TYPE: ICD-10-CM

## 2021-08-11 DIAGNOSIS — O24.410 GDM (GESTATIONAL DIABETES MELLITUS), CLASS A1: ICD-10-CM

## 2021-08-11 LAB
ABO GROUP BLD: NORMAL
ALBUMIN SERPL-MCNC: 3.9 G/DL (ref 3.5–5.2)
ALBUMIN/GLOB SERPL: 1.2 G/DL
ALP SERPL-CCNC: 129 U/L (ref 39–117)
ALT SERPL W P-5'-P-CCNC: 12 U/L (ref 1–33)
ANION GAP SERPL CALCULATED.3IONS-SCNC: 21 MMOL/L (ref 5–15)
AST SERPL-CCNC: 16 U/L (ref 1–32)
BASOPHILS # BLD AUTO: 0.01 10*3/MM3 (ref 0–0.2)
BASOPHILS NFR BLD AUTO: 0.1 % (ref 0–1.5)
BILIRUB BLD-MCNC: ABNORMAL MG/DL
BILIRUB SERPL-MCNC: 0.5 MG/DL (ref 0–1.2)
BLD GP AB SCN SERPL QL: NEGATIVE
BUN SERPL-MCNC: 5 MG/DL (ref 6–20)
BUN/CREAT SERPL: 8.1 (ref 7–25)
CALCIUM SPEC-SCNC: 10 MG/DL (ref 8.6–10.5)
CHLORIDE SERPL-SCNC: 102 MMOL/L (ref 98–107)
CLARITY, POC: CLEAR
CO2 SERPL-SCNC: 12 MMOL/L (ref 22–29)
COLOR UR: YELLOW
CREAT SERPL-MCNC: 0.62 MG/DL (ref 0.57–1)
DEPRECATED RDW RBC AUTO: 44.1 FL (ref 37–54)
EOSINOPHIL # BLD AUTO: 0.03 10*3/MM3 (ref 0–0.4)
EOSINOPHIL NFR BLD AUTO: 0.3 % (ref 0.3–6.2)
ERYTHROCYTE [DISTWIDTH] IN BLOOD BY AUTOMATED COUNT: 13.7 % (ref 12.3–15.4)
EXPIRATION DATE: 0
GFR SERPL CREATININE-BSD FRML MDRD: 108 ML/MIN/1.73
GFR SERPL CREATININE-BSD FRML MDRD: 131 ML/MIN/1.73
GLOBULIN UR ELPH-MCNC: 3.3 GM/DL
GLUCOSE BLDC GLUCOMTR-MCNC: 104 MG/DL (ref 70–130)
GLUCOSE SERPL-MCNC: 81 MG/DL (ref 65–99)
GLUCOSE UR STRIP-MCNC: NEGATIVE MG/DL
GLUCOSE UR STRIP-MCNC: NEGATIVE MG/DL
HCT VFR BLD AUTO: 41.9 % (ref 34–46.6)
HGB BLD-MCNC: 14.2 G/DL (ref 12–15.9)
IMM GRANULOCYTES # BLD AUTO: 0.03 10*3/MM3 (ref 0–0.05)
IMM GRANULOCYTES NFR BLD AUTO: 0.3 % (ref 0–0.5)
KETONES UR QL: ABNORMAL
LEUKOCYTE EST, POC: NEGATIVE
LIPASE SERPL-CCNC: 23 U/L (ref 13–60)
LYMPHOCYTES # BLD AUTO: 2.37 10*3/MM3 (ref 0.7–3.1)
LYMPHOCYTES NFR BLD AUTO: 22.6 % (ref 19.6–45.3)
Lab: 0
MCH RBC QN AUTO: 30 PG (ref 26.6–33)
MCHC RBC AUTO-ENTMCNC: 33.9 G/DL (ref 31.5–35.7)
MCV RBC AUTO: 88.6 FL (ref 79–97)
MONOCYTES # BLD AUTO: 1 10*3/MM3 (ref 0.1–0.9)
MONOCYTES NFR BLD AUTO: 9.5 % (ref 5–12)
NEUTROPHILS NFR BLD AUTO: 67.2 % (ref 42.7–76)
NEUTROPHILS NFR BLD AUTO: 7.04 10*3/MM3 (ref 1.7–7)
NITRITE UR-MCNC: NEGATIVE MG/ML
NRBC BLD AUTO-RTO: 0 /100 WBC (ref 0–0.2)
PH UR: 5.5 [PH] (ref 5–8)
PLATELET # BLD AUTO: 349 10*3/MM3 (ref 140–450)
PMV BLD AUTO: 9.5 FL (ref 6–12)
POTASSIUM SERPL-SCNC: 3.8 MMOL/L (ref 3.5–5.2)
PROT SERPL-MCNC: 7.2 G/DL (ref 6–8.5)
PROT UR STRIP-MCNC: ABNORMAL MG/DL
PROT UR STRIP-MCNC: NEGATIVE MG/DL
RBC # BLD AUTO: 4.73 10*6/MM3 (ref 3.77–5.28)
RBC # UR STRIP: ABNORMAL /UL
RH BLD: POSITIVE
SARS-COV-2 RDRP RESP QL NAA+PROBE: NORMAL
SODIUM SERPL-SCNC: 135 MMOL/L (ref 136–145)
SP GR UR: 1.03 (ref 1–1.03)
T&S EXPIRATION DATE: NORMAL
UROBILINOGEN UR QL: NORMAL
WBC # BLD AUTO: 10.48 10*3/MM3 (ref 3.4–10.8)

## 2021-08-11 PROCEDURE — 59025 FETAL NON-STRESS TEST: CPT

## 2021-08-11 PROCEDURE — 0502F SUBSEQUENT PRENATAL CARE: CPT | Performed by: OBSTETRICS & GYNECOLOGY

## 2021-08-11 PROCEDURE — 36415 COLL VENOUS BLD VENIPUNCTURE: CPT | Performed by: OBSTETRICS & GYNECOLOGY

## 2021-08-11 PROCEDURE — 83690 ASSAY OF LIPASE: CPT | Performed by: OBSTETRICS & GYNECOLOGY

## 2021-08-11 PROCEDURE — 86900 BLOOD TYPING SEROLOGIC ABO: CPT | Performed by: OBSTETRICS & GYNECOLOGY

## 2021-08-11 PROCEDURE — 82962 GLUCOSE BLOOD TEST: CPT

## 2021-08-11 PROCEDURE — 80053 COMPREHEN METABOLIC PANEL: CPT | Performed by: OBSTETRICS & GYNECOLOGY

## 2021-08-11 PROCEDURE — 86850 RBC ANTIBODY SCREEN: CPT | Performed by: OBSTETRICS & GYNECOLOGY

## 2021-08-11 PROCEDURE — 87635 SARS-COV-2 COVID-19 AMP PRB: CPT | Performed by: OBSTETRICS & GYNECOLOGY

## 2021-08-11 PROCEDURE — 25010000002 ONDANSETRON PER 1 MG: Performed by: OBSTETRICS & GYNECOLOGY

## 2021-08-11 PROCEDURE — 86901 BLOOD TYPING SEROLOGIC RH(D): CPT | Performed by: OBSTETRICS & GYNECOLOGY

## 2021-08-11 PROCEDURE — C9803 HOPD COVID-19 SPEC COLLECT: HCPCS

## 2021-08-11 PROCEDURE — 99218 PR INITIAL OBSERVATION CARE/DAY 30 MINUTES: CPT | Performed by: OBSTETRICS & GYNECOLOGY

## 2021-08-11 PROCEDURE — G0463 HOSPITAL OUTPT CLINIC VISIT: HCPCS

## 2021-08-11 PROCEDURE — 96361 HYDRATE IV INFUSION ADD-ON: CPT

## 2021-08-11 PROCEDURE — 85025 COMPLETE CBC W/AUTO DIFF WBC: CPT | Performed by: OBSTETRICS & GYNECOLOGY

## 2021-08-11 PROCEDURE — 96374 THER/PROPH/DIAG INJ IV PUSH: CPT

## 2021-08-11 RX ORDER — LIDOCAINE HYDROCHLORIDE 10 MG/ML
5 INJECTION, SOLUTION EPIDURAL; INFILTRATION; INTRACAUDAL; PERINEURAL AS NEEDED
Status: DISCONTINUED | OUTPATIENT
Start: 2021-08-11 | End: 2021-08-12 | Stop reason: HOSPADM

## 2021-08-11 RX ORDER — ONDANSETRON 2 MG/ML
4 INJECTION INTRAMUSCULAR; INTRAVENOUS EVERY 6 HOURS PRN
Status: DISCONTINUED | OUTPATIENT
Start: 2021-08-11 | End: 2021-08-12 | Stop reason: HOSPADM

## 2021-08-11 RX ORDER — SODIUM CHLORIDE, SODIUM LACTATE, POTASSIUM CHLORIDE, CALCIUM CHLORIDE 600; 310; 30; 20 MG/100ML; MG/100ML; MG/100ML; MG/100ML
150 INJECTION, SOLUTION INTRAVENOUS CONTINUOUS
Status: DISCONTINUED | OUTPATIENT
Start: 2021-08-11 | End: 2021-08-12 | Stop reason: HOSPADM

## 2021-08-11 RX ORDER — SODIUM CHLORIDE 0.9 % (FLUSH) 0.9 %
10 SYRINGE (ML) INJECTION AS NEEDED
Status: DISCONTINUED | OUTPATIENT
Start: 2021-08-11 | End: 2021-08-12 | Stop reason: HOSPADM

## 2021-08-11 RX ORDER — SODIUM CHLORIDE 0.9 % (FLUSH) 0.9 %
3 SYRINGE (ML) INJECTION EVERY 12 HOURS SCHEDULED
Status: DISCONTINUED | OUTPATIENT
Start: 2021-08-11 | End: 2021-08-12 | Stop reason: HOSPADM

## 2021-08-11 RX ORDER — FAMOTIDINE 20 MG/1
20 TABLET, FILM COATED ORAL 2 TIMES DAILY
COMMUNITY
End: 2021-08-12 | Stop reason: HOSPADM

## 2021-08-11 RX ADMIN — SODIUM CHLORIDE, POTASSIUM CHLORIDE, SODIUM LACTATE AND CALCIUM CHLORIDE 150 ML/HR: 600; 310; 30; 20 INJECTION, SOLUTION INTRAVENOUS at 19:57

## 2021-08-11 RX ADMIN — SODIUM CHLORIDE, POTASSIUM CHLORIDE, SODIUM LACTATE AND CALCIUM CHLORIDE 150 ML/HR: 600; 310; 30; 20 INJECTION, SOLUTION INTRAVENOUS at 21:00

## 2021-08-11 RX ADMIN — ONDANSETRON 4 MG: 2 INJECTION INTRAMUSCULAR; INTRAVENOUS at 20:05

## 2021-08-11 RX ADMIN — LIDOCAINE HYDROCHLORIDE: 20 SOLUTION ORAL; TOPICAL at 22:08

## 2021-08-11 NOTE — TELEPHONE ENCOUNTER
Fasting blood sugars 104 yesterday 116 this AM. Post prandials running anywhere between 90-130s (when able to keep food down). Not currently taking medication. Not following with endocrinology.    Nausea/vomiting began worsening Sunday. Emesis 5-6x daily; unable to hold down water either. Tried prevacid with no relief. Vitamin B6 did not help her at the beginning of her pregnancy. Still able to produce urine.    Appointment with Chitra at 3:50 today

## 2021-08-11 NOTE — H&P
SCOUT Alexandre  Obstetric History and Physical    CC: N/V x 4d    Subjective     Patient is a 36 y.o. female  currently at 32w6d, who presents with nausea vomiting that began suddenly on .  She is currently unable to keep anything down including liquids.  Denies diarrhea.  Denies pain.  Notes good fetal movement.    Her prenatal care is complicated by  diabetes  GDM A1.  Blood sugars have been well controlled until she started with the nausea and vomiting.  Her previous obstetric/gynecological history is noted for is remarkable for .    The following portions of the patients history were reviewed and updated as appropriate: current medications, allergies, past medical history, past surgical history, past family history, past social history and problem list .       Prenatal Information:  Prenatal Results     POC Urine Glucose/Protein     Test Value Reference Range Date Time    Urine Glucose  Negative mg/dL Negative, 1000 mg/dL (3+) 21 174    Urine Protein  2+ mg/dL Negative 21 1740          Initial Prenatal Labs     Test Value Reference Range Date Time    Hemoglobin  13.6 g/dL 11.1 - 15.9 2124    Hematocrit  39.8 % 34.0 - 46.6 21 0924    Platelets  313 10*3/mm3 140 - 450 21 0951       314 x10E3/uL 150 - 450 2124    Rubella IgG  4.38 index Immune >0.99 21    Hepatitis B SAg  Negative  Negative 21    Hepatitis C Ab  <0.1 s/co ratio 0.0 - 0.9 21    RPR  Non Reactive  Non Reactive 2124    ABO  A   21    Rh  Positive   2124    Antibody Screen  Negative  Negative 2124    HIV  Non Reactive  Non Reactive 21    Urine Culture  Final report   21    Gonorrhea ^ Negative   10/26/18     Chlamydia ^ Negative   10/26/18     TSH              2nd and 3rd Trimester     Test Value Reference Range Date Time    Hemoglobin (repeated)  11.7 g/dL 12.0 - 15.9 21 0951    Hematocrit (repeated)   34.6 % 34.0 - 46.6 07/16/21 0951    GCT  166 mg/dL 65 - 139 07/16/21 0951    Antibody Screen (repeated)  Negative  Negative 07/16/21 0951    GTT Fasting  100 mg/dL 65 - 94 07/26/21 0921    GTT 1 Hr  199 mg/dL 65 - 179 07/26/21 0921    GTT 2 Hr  195 mg/dL 65 - 154 07/26/21 0921    GTT 3 Hr  150 mg/dL 65 - 139 07/26/21 0921    Group B Strep              Drug Screening     Test Value Reference Range Date Time    Amphetamine Screen  Negative ng/mL Wdegbw=3014 03/05/21 0924    Barbiturate Screen  Negative ng/mL Ivqmoz=693 03/05/21 0924    Benzodiazepine Screen  Negative ng/mL Aajnac=551 03/05/21 0924    Methadone Screen  Negative ng/mL Ujsnkc=767 03/05/21 0924    Phencyclidine Screen  Negative ng/mL Cutoff=25 03/05/21 0924    Opiates Screen  Negative ng/mL Foxmlg=941 03/05/21 0924    THC Screen  Negative ng/mL Cutoff=20 03/05/21 0924    Cocaine Screen  Negative ng/mL Tnrjmr=456 03/05/21 0924    Propoxyphene Screen  Negative ng/mL Vmkxtg=960 03/05/21 0924    Buprenorphine Screen        Methamphetamine Screen        Oxycodone Screen        Tricyclic Antidepressants Screen              Other (Risk screening)     Test Value Reference Range Date Time    Varicella IgG        Parvovirus IgG        CMV IgG        Cystic Fibrosis        Hemoglobin electrophoresis        NIPT        MSAFP-4        AFP (for NTD only)              Legend    ^: Historical                      External Prenatal Results     Pregnancy Outside Results - Transcribed From Office Records - See Scanned Records For Details     Test Value Date Time    ABO  A  03/05/21 0924    Rh  Positive  03/05/21 0924    Antibody Screen  Negative  07/16/21 0951       Negative  03/05/21 0924    Varicella IgG       Rubella  4.38 index 03/05/21 0924    Hgb  11.7 g/dL 07/16/21 0951       13.6 g/dL 03/05/21 0924    Hct  34.6 % 07/16/21 0951       39.8 % 03/05/21 0924    Glucose Fasting GTT  100 mg/dL 07/26/21 0921    Glucose Tolerance Test 1 hour  199 mg/dL 07/26/21 0921     Glucose Tolerance Test 3 hour  150 mg/dL 21    Gonorrhea (discrete) ^ Negative  10/26/18     Chlamydia (discrete) ^ Negative  10/26/18     RPR  Non Reactive  21    VDRL       Syphilis Antibody       HBsAg  Negative  21    Herpes Simplex Virus PCR       Herpes Simplex VIrus Culture       HIV  Non Reactive  21    Hep C RNA Quant PCR       Hep C Antibody  <0.1 s/co ratio 21    AFP       Group B Strep  No Group B Streptococcus isolated  19 1138    GBS Susceptibility to Clindamycin       GBS Susceptibility to Erythromycin       Fetal Fibronectin       Genetic Testing, Maternal Blood             Drug Screening     Test Value Date Time    Urine Drug Screen ^ Negative  10/12/18     Amphetamine Screen  Negative ng/mL 21    Barbiturate Screen  Negative ng/mL 21    Benzodiazepine Screen  Negative ng/mL 21    Methadone Screen  Negative ng/mL 21    Phencyclidine Screen  Negative ng/mL 21    Opiates Screen       THC Screen       Cocaine Screen       Propoxyphene Screen  Negative ng/mL 21    Buprenorphine Screen       Methamphetamine Screen       Oxycodone Screen       Tricyclic Antidepressants Screen             Legend    ^: Historical                         Past OB History:     OB History    Para Term  AB Living   4 2 1 0 1 1   SAB TAB Ectopic Molar Multiple Live Births   1 0 0 0 0 1      # Outcome Date GA Lbr Aaron/2nd Weight Sex Delivery Anes PTL Lv   4 Current            3 SAB 10/2020           2 Term 19 40w4d 38:50 / 03:58 4255 g (9 lb 6.1 oz) F Vag-Spont EPI N PAULINE      Name: AV ALBERT      Apgar1: 8  Apgar5: 9   1 Para 17 14w5d    Vag-Spont None  FD      Name: ROOSEVELTPENDING  FD      Apgar1: 0  Apgar5: 0       Past Medical History: Past Medical History:   Diagnosis Date   • ADHD    • Bipolar disorder (CMS/HCC)    • Depression       Past Surgical History Past  Surgical History:   Procedure Laterality Date   • DILATATION AND CURETTAGE     • MOUTH SURGERY     • TOOTH EXTRACTION     • WISDOM TOOTH EXTRACTION        Family History: Family History   Problem Relation Age of Onset   • Breast cancer Other    • Hypertension Other       Social History:  reports that she has quit smoking. She has never used smokeless tobacco.   reports no history of alcohol use.   reports no history of drug use.        Review of Systems      Objective     Vital Signs Range for the last 24 hours  Temperature: Temp:  [98.1 °F (36.7 °C)] 98.1 °F (36.7 °C)   Temp Source: Temp src: Oral   BP: BP: (110)/(70) 110/70   Pulse: Heart Rate:  [118] 118   Respirations: Resp:  [16] 16   SPO2: SpO2:  [99 %] 99 %   O2 Amount (l/min):     O2 Devices     Weight: Weight:  [92.1 kg (203 lb)] 92.1 kg (203 lb)     Physical Examination: General appearance - alert, well appearing, and in no distress  Neck - supple, no significant adenopathy  Abdomen - soft, nontender, nondistended, no masses or organomegaly  Musculoskeletal - no joint tenderness, deformity or swelling  Extremities - peripheral pulses normal, no pedal edema, no clubbing or cyanosis  Skin - normal coloration and turgor, no rashes, no suspicious skin lesions noted      Fetal Heart Rate Assessment   Method:     Beats/min:     Baseline:     Varibility:     Accels:     Decels:     Tracing Category:       Uterine Assessment   Method:     Frequency (min):     Ctx Count in 10 min:     Duration:     Intensity:     Intensity by IUPC:     Resting Tone:     Resting Tone by IUPC:     [place] Units:       WBC   Date Value Ref Range Status   08/11/2021 10.48 3.40 - 10.80 10*3/mm3 Final     RBC   Date Value Ref Range Status   08/11/2021 4.73 3.77 - 5.28 10*6/mm3 Final     Hemoglobin   Date Value Ref Range Status   08/11/2021 14.2 12.0 - 15.9 g/dL Final     Hematocrit   Date Value Ref Range Status   08/11/2021 41.9 34.0 - 46.6 % Final     MCV   Date Value Ref Range  Status   08/11/2021 88.6 79.0 - 97.0 fL Final     MCH   Date Value Ref Range Status   08/11/2021 30.0 26.6 - 33.0 pg Final     MCHC   Date Value Ref Range Status   08/11/2021 33.9 31.5 - 35.7 g/dL Final     RDW   Date Value Ref Range Status   08/11/2021 13.7 12.3 - 15.4 % Final     RDW-SD   Date Value Ref Range Status   08/11/2021 44.1 37.0 - 54.0 fl Final     MPV   Date Value Ref Range Status   08/11/2021 9.5 6.0 - 12.0 fL Final     Platelets   Date Value Ref Range Status   08/11/2021 349 140 - 450 10*3/mm3 Final     Neutrophil %   Date Value Ref Range Status   08/11/2021 67.2 42.7 - 76.0 % Final     Lymphocyte %   Date Value Ref Range Status   08/11/2021 22.6 19.6 - 45.3 % Final     Monocyte %   Date Value Ref Range Status   08/11/2021 9.5 5.0 - 12.0 % Final     Eosinophil %   Date Value Ref Range Status   08/11/2021 0.3 0.3 - 6.2 % Final     Basophil %   Date Value Ref Range Status   08/11/2021 0.1 0.0 - 1.5 % Final     Immature Grans %   Date Value Ref Range Status   08/11/2021 0.3 0.0 - 0.5 % Final     Neutrophils, Absolute   Date Value Ref Range Status   08/11/2021 7.04 (H) 1.70 - 7.00 10*3/mm3 Final     Lymphocytes, Absolute   Date Value Ref Range Status   08/11/2021 2.37 0.70 - 3.10 10*3/mm3 Final     Monocytes, Absolute   Date Value Ref Range Status   08/11/2021 1.00 (H) 0.10 - 0.90 10*3/mm3 Final     Eosinophils, Absolute   Date Value Ref Range Status   08/11/2021 0.03 0.00 - 0.40 10*3/mm3 Final     Basophils, Absolute   Date Value Ref Range Status   08/11/2021 0.01 0.00 - 0.20 10*3/mm3 Final     Immature Grans, Absolute   Date Value Ref Range Status   08/11/2021 0.03 0.00 - 0.05 10*3/mm3 Final     nRBC   Date Value Ref Range Status   08/11/2021 0.0 0.0 - 0.2 /100 WBC Final     Lab Results   Component Value Date    GLUCOSE 81 08/11/2021    BUN 5 (L) 08/11/2021    CREATININE 0.62 08/11/2021    EGFRIFNONA 108 08/11/2021    EGFRIFAFRI 131 08/11/2021    BCR 8.1 08/11/2021    K 3.8 08/11/2021    CO2 12.0 (L)  08/11/2021    CALCIUM 10.0 08/11/2021    ALBUMIN 3.90 08/11/2021    AST 16 08/11/2021    ALT 12 08/11/2021     UA - neg except SG > 1.030, Ketones 80    Non Stress Test: minutes >20min  non-stress test: NST: Reactive  indication: Diabetes Mellitus Gestational  category: Category I      Assessment/Plan       Nausea & vomiting      Assessment & Plan    Assessment:  1.  Intrauterine pregnancy at 32w6d weeks gestation with reassuring fetal status.    2.  N/V - likely gastroenteritis  3.  Gestational DM - diet controlled    Plan:  1.  IV fluids, antiemetics and rest overnight.  Likely discharge tomorrow feeling much better.  2. Plan of care has been reviewed with patient   3.  Risks, benefits of treatment plan have been discussed.  4.  All questions have been answered.      Jessica Buenrostro MD  8/11/2021  18:16 EDT

## 2021-08-11 NOTE — PROGRESS NOTES
OB FOLLOW UP    Reina Benitez is a 36 y.o.  32w6d patient being seen today for her obstetrical follow up visit. Patient reports gestational diabetes. Nausea and vomiting patient has not been able to keep any food or beverage down including water. Blood sugar has ranged from the 90's-130's. Patient is not currently on any diabetic medication and not following endocrinology. Began  and has gotten worse. Patient can produce urine. Patient has tried pepcid and vitamin B 6 with no relief for the nausea and vomitng.patient reports some dizziness as well as shortness of breath when she gets up to do something and feels her heart rate increasing    Her prenatal care is complicated by (and status) :  AMA, gestational diabetes, failed 3 hour gtt  Patient Active Problem List   Diagnosis   • Multigravida of advanced maternal age in second trimester   • Pregnancy   • GDM (gestational diabetes mellitus), class A1   • Nausea & vomiting       ROS -   Patient Reports : nausea and vomiting, ccant keep anything down including water, eleveated blood suagr readings, dizziness  Patient Denies: Loss of Fluid and Vaginal Spotting  Fetal Movement : normal      /70   Wt 92.1 kg (203 lb)   LMP 2020   BMI 33.78 kg/m²     Ultrasound Today: no    EXAM:  Vitals: See prenatal flowsheet   Abdomen: See prenatal flowsheet   Urine glucose/protein: See prenatal flowsheet   Pelvic: See prenatal flowsheet     Urinalysis with specific gravity greater than 1.030 and positive for moderate ketones    Assessment    Diagnoses and all orders for this visit:    1. 36 weeks gestation of pregnancy (Primary)  -     POC Protein, Urine, Qualitative, Dipstick  -     POC Glucose, Urine, Qualitative, Dipstick  -     POC Urinalysis Dipstick  -     Urine Culture - Urine, Urine, Clean Catch    2. Intractable vomiting with nausea, unspecified vomiting type    3. GDM (gestational diabetes mellitus), class A1    4. Multigravida of advanced  maternal age in second trimester    Other orders  -     SCANNED - PROCEDURE  -     Please Note    5.  Dehydration    1. Pregnancy at 32w6d  2. Fetal status reassuring     Problem List Items Addressed This Visit     Multigravida of advanced maternal age in second trimester    Overview     Panorama within normal         Pregnancy - Primary    Relevant Orders    POC Protein, Urine, Qualitative, Dipstick (Completed)    POC Glucose, Urine, Qualitative, Dipstick (Completed)    POC Urinalysis Dipstick (Completed)    Urine Culture - Urine, Urine, Clean Catch (Completed)    GDM (gestational diabetes mellitus), class A1    Overview     Needs Diabetes counseling  Growth 32wk, NSTs weekly thereafter         Nausea & vomiting          Plan    1. Fetal movement/ Labor Precautions  2. We will send to triage for IV fluids and antiemetics, will check labs especially for her liver and gallbladder although she notes no right upper quadrant pain  Follow Up: Return for To L&D triage.        Jessica Buenrostro MD  2021

## 2021-08-11 NOTE — TELEPHONE ENCOUNTER
Patient states she has gestational diabetes, she hasnt been able to keep anything down has felt dizzy and states her blood sugar has been high, is concerned would like to discuss.

## 2021-08-12 VITALS
HEART RATE: 96 BPM | RESPIRATION RATE: 14 BRPM | OXYGEN SATURATION: 98 % | BODY MASS INDEX: 33.82 KG/M2 | TEMPERATURE: 98 F | SYSTOLIC BLOOD PRESSURE: 124 MMHG | DIASTOLIC BLOOD PRESSURE: 72 MMHG | HEIGHT: 65 IN | WEIGHT: 203 LBS

## 2021-08-12 PROCEDURE — 96361 HYDRATE IV INFUSION ADD-ON: CPT

## 2021-08-12 PROCEDURE — 59025 FETAL NON-STRESS TEST: CPT

## 2021-08-12 RX ORDER — PANTOPRAZOLE SODIUM 40 MG/10ML
40 INJECTION, POWDER, LYOPHILIZED, FOR SOLUTION INTRAVENOUS
Status: DISCONTINUED | OUTPATIENT
Start: 2021-08-12 | End: 2021-08-12 | Stop reason: HOSPADM

## 2021-08-12 RX ORDER — ONDANSETRON 4 MG/1
8 TABLET, ORALLY DISINTEGRATING ORAL EVERY 8 HOURS PRN
Qty: 9 TABLET | Refills: 0 | Status: SHIPPED | OUTPATIENT
Start: 2021-08-12 | End: 2021-09-16 | Stop reason: HOSPADM

## 2021-08-12 RX ORDER — PANTOPRAZOLE SODIUM 40 MG/1
40 TABLET, DELAYED RELEASE ORAL
Status: DISCONTINUED | OUTPATIENT
Start: 2021-08-12 | End: 2021-08-12 | Stop reason: HOSPADM

## 2021-08-12 RX ORDER — PANTOPRAZOLE SODIUM 40 MG/1
40 TABLET, DELAYED RELEASE ORAL
Qty: 90 TABLET | Refills: 1 | Status: SHIPPED | OUTPATIENT
Start: 2021-08-13 | End: 2021-09-16 | Stop reason: HOSPADM

## 2021-08-12 RX ADMIN — PANTOPRAZOLE SODIUM 40 MG: 40 TABLET, DELAYED RELEASE ORAL at 10:13

## 2021-08-12 RX ADMIN — SODIUM CHLORIDE, POTASSIUM CHLORIDE, SODIUM LACTATE AND CALCIUM CHLORIDE 150 ML/HR: 600; 310; 30; 20 INJECTION, SOLUTION INTRAVENOUS at 08:06

## 2021-08-12 RX ADMIN — SODIUM CHLORIDE, POTASSIUM CHLORIDE, SODIUM LACTATE AND CALCIUM CHLORIDE 150 ML/HR: 600; 310; 30; 20 INJECTION, SOLUTION INTRAVENOUS at 00:29

## 2021-08-13 ENCOUNTER — ROUTINE PRENATAL (OUTPATIENT)
Dept: OBSTETRICS AND GYNECOLOGY | Facility: CLINIC | Age: 36
End: 2021-08-13

## 2021-08-13 VITALS — SYSTOLIC BLOOD PRESSURE: 118 MMHG | DIASTOLIC BLOOD PRESSURE: 74 MMHG | WEIGHT: 212.8 LBS | BODY MASS INDEX: 35.41 KG/M2

## 2021-08-13 DIAGNOSIS — Z3A.33 33 WEEKS GESTATION OF PREGNANCY: Primary | ICD-10-CM

## 2021-08-13 DIAGNOSIS — O09.522 MULTIGRAVIDA OF ADVANCED MATERNAL AGE IN SECOND TRIMESTER: ICD-10-CM

## 2021-08-13 DIAGNOSIS — O24.410 GDM (GESTATIONAL DIABETES MELLITUS), CLASS A1: ICD-10-CM

## 2021-08-13 PROBLEM — Z3A.29 29 WEEKS GESTATION OF PREGNANCY: Status: RESOLVED | Noted: 2021-07-16 | Resolved: 2021-08-13

## 2021-08-13 LAB
BACTERIA UR CULT: NORMAL
BACTERIA UR CULT: NORMAL
EXPIRATION DATE: 0
GLUCOSE UR STRIP-MCNC: NEGATIVE MG/DL
Lab: 0
Lab: NORMAL
PROT UR STRIP-MCNC: NEGATIVE MG/DL

## 2021-08-13 PROCEDURE — 0502F SUBSEQUENT PRENATAL CARE: CPT | Performed by: OBSTETRICS & GYNECOLOGY

## 2021-08-13 NOTE — PROGRESS NOTES
OB FOLLOW UP    Reina Benitez is a 36 y.o.  33w1d patient being seen today for her obstetrical follow up visit. Patient reports being released from hospital yesterday due to receiving fluids. Reports that she no longer has n/v..     Her prenatal care is complicated by (and status) :    Patient Active Problem List   Diagnosis   • Multigravida of advanced maternal age in second trimester   • Pregnancy   • GDM (gestational diabetes mellitus), class A1   • Nausea & vomiting       ROS -   Patient Reports : No Problems  Patient Denies: Loss of Fluid, Vaginal Spotting, Vision Changes, Headaches and Cramping/Contractions   Fetal Movement : normal      /74   Wt 96.5 kg (212 lb 12.8 oz)   LMP 2020   BMI 35.41 kg/m²     Ultrasound Today: yes    EXAM:  Vitals: See prenatal flowsheet   Abdomen: See prenatal flowsheet   Urine glucose/protein: See prenatal flowsheet   Pelvic: See prenatal flowsheet     Assessment    Diagnoses and all orders for this visit:    1. 33 weeks gestation of pregnancy (Primary)  -     POC Glucose, Urine, Qualitative, Dipstick  -     POC Protein, Urine, Qualitative, Dipstick    2. GDM (gestational diabetes mellitus), class A1    3. Multigravida of advanced maternal age in second trimester        1. Pregnancy at 33w1d  2. Fetal status reassuring     Problem List Items Addressed This Visit     Multigravida of advanced maternal age in second trimester    Overview     Panorama within normal         Pregnancy - Primary    Relevant Orders    POC Glucose, Urine, Qualitative, Dipstick (Completed)    POC Protein, Urine, Qualitative, Dipstick (Completed)    GDM (gestational diabetes mellitus), class A1    Overview     Needs Diabetes counseling  Growth 32wk, NSTs weekly thereafter               Plan    1. Fetal movement/ Labor Precautions  2. Reviewed normal BPP and sono  3. Review of fingerstick blood sugars are likely abnormal because she has had nausea and vomiting but this  morning's was still 105.  Diet and exercise reinforced and will see what they are this next week.  If still elevated I would start Metformin.  Follow Up: Return in about 1 week (around 8/20/2021) for WITH NST.        Jessica Buenrostro MD  08/13/2021

## 2021-08-17 ENCOUNTER — HOSPITAL ENCOUNTER (OUTPATIENT)
Dept: DIABETES SERVICES | Facility: HOSPITAL | Age: 36
Setting detail: RECURRING SERIES
Discharge: HOME | End: 2021-08-17

## 2021-08-17 NOTE — CONSULTS
DIABETES EDUCATION CONSULT, 30 minutes GDM follow up.  This medical referred consult was provided as a telephone call, tele-health or e-visit, as patient is unable to attend an in-office appointment due to the COVID-19 crisis. Consent for treatment was given verbally.Please see media tab for assessment and notes if you use [facility]. If you are not an [facility] user a copy of patient's assessment and notes will be sent per routine. Thank you.

## 2021-08-20 ENCOUNTER — ROUTINE PRENATAL (OUTPATIENT)
Dept: OBSTETRICS AND GYNECOLOGY | Facility: CLINIC | Age: 36
End: 2021-08-20

## 2021-08-20 VITALS — BODY MASS INDEX: 35.94 KG/M2 | SYSTOLIC BLOOD PRESSURE: 120 MMHG | DIASTOLIC BLOOD PRESSURE: 78 MMHG | WEIGHT: 216 LBS

## 2021-08-20 DIAGNOSIS — O24.410 GDM (GESTATIONAL DIABETES MELLITUS), CLASS A1: ICD-10-CM

## 2021-08-20 DIAGNOSIS — O09.522 MULTIGRAVIDA OF ADVANCED MATERNAL AGE IN SECOND TRIMESTER: ICD-10-CM

## 2021-08-20 DIAGNOSIS — Z3A.34 34 WEEKS GESTATION OF PREGNANCY: Primary | ICD-10-CM

## 2021-08-20 LAB
GLUCOSE UR STRIP-MCNC: NEGATIVE MG/DL
PROT UR STRIP-MCNC: NEGATIVE MG/DL

## 2021-08-20 PROCEDURE — 59025 FETAL NON-STRESS TEST: CPT | Performed by: NURSE PRACTITIONER

## 2021-08-20 PROCEDURE — 0502F SUBSEQUENT PRENATAL CARE: CPT | Performed by: NURSE PRACTITIONER

## 2021-08-20 NOTE — PROGRESS NOTES
OB FOLLOW UP  CC- Here for care of pregnancy        Reina Benitez is a 36 y.o.  34w1d patient being seen today for her obstetrical follow up visit. Patient reports mild swelling.  NST today    BS's reviewed. Fastings mostly under 100, with an occ 103-106, PP <120.     Her prenatal care is complicated by (and status) :    Patient Active Problem List   Diagnosis   • Multigravida of advanced maternal age in second trimester   • Pregnancy   • GDM (gestational diabetes mellitus), class A1   • Nausea & vomiting       Flu Status: Desires at future appt  Ultrasound Today: No.    ROS -   Patient Reports : BH ctxs swelling  Patient Denies: Loss of Fluid, Vaginal Spotting, Vision Changes and Headaches  Fetal Movement : normal  All other systems reviewed and are negative.       The additional following portions of the patient's history were reviewed and updated as appropriate: allergies, current medications, past family history, past medical history, past social history, past surgical history and problem list.    I have reviewed and agree with the HPI, ROS, and historical information as entered above. Yelitza Williamson, SWETHA    /78   Wt 98 kg (216 lb)   LMP 2020   BMI 35.94 kg/m²       EXAM:     FHT: pos BPM     Urine glucose/protein: See prenatal flowsheet       Assessment and Plan    Problem List Items Addressed This Visit        Endocrine and Metabolic    GDM (gestational diabetes mellitus), class A1    Overview     Needs Diabetes counseling  Growth 32wk, NSTs weekly thereafter            Gravid and     Multigravida of advanced maternal age in second trimester    Overview     Panorama within normal         Pregnancy - Primary    Relevant Orders    POC Urinalysis Dipstick (Completed)          1. Pregnancy at 34w1d  2. Fetal status reassuring. NST reactive.   3. GDM, diet controlled, continue to monitor.   Return in about 1 week (around 2021). with NST    SWETHA Harmon  2021

## 2021-08-27 ENCOUNTER — ROUTINE PRENATAL (OUTPATIENT)
Dept: OBSTETRICS AND GYNECOLOGY | Facility: CLINIC | Age: 36
End: 2021-08-27

## 2021-08-27 VITALS — BODY MASS INDEX: 35.45 KG/M2 | SYSTOLIC BLOOD PRESSURE: 128 MMHG | WEIGHT: 213 LBS | DIASTOLIC BLOOD PRESSURE: 82 MMHG

## 2021-08-27 DIAGNOSIS — Z3A.35 35 WEEKS GESTATION OF PREGNANCY: Primary | ICD-10-CM

## 2021-08-27 DIAGNOSIS — O09.522 MULTIGRAVIDA OF ADVANCED MATERNAL AGE IN SECOND TRIMESTER: ICD-10-CM

## 2021-08-27 DIAGNOSIS — O24.410 GDM (GESTATIONAL DIABETES MELLITUS), CLASS A1: ICD-10-CM

## 2021-08-27 LAB
GLUCOSE UR STRIP-MCNC: NEGATIVE MG/DL
PROT UR STRIP-MCNC: NEGATIVE MG/DL

## 2021-08-27 PROCEDURE — 0502F SUBSEQUENT PRENATAL CARE: CPT | Performed by: NURSE PRACTITIONER

## 2021-08-27 PROCEDURE — 59025 FETAL NON-STRESS TEST: CPT | Performed by: NURSE PRACTITIONER

## 2021-08-27 NOTE — PROGRESS NOTES
OB FOLLOW UP  CC- Here for care of pregnancy        Reina Benitez is a 36 y.o.  35w1d patient being seen today for her obstetrical follow up visit. Patient reports no complaints. Pt states her fasting glucose are less than 100 and her 2 hr PP is less than 120.  Pt. States she is doing good with her glucose.  She states she eats the same food to get normal glucose readings.    Her prenatal care is complicated by (and status) :    Patient Active Problem List   Diagnosis   • Multigravida of advanced maternal age in second trimester   • Pregnancy   • GDM (gestational diabetes mellitus), class A1   • Nausea & vomiting       Flu Status: Desires at future appt  Ultrasound Today: No.    ROS -   Patient Reports : No Problems, Loss of Fluid, Vaginal Spotting and Vision Changes  Patient Denies: Loss of Fluid, Vaginal Spotting, Vision Changes and Headaches  Fetal Movement : normal  All other systems reviewed and are negative.       The additional following portions of the patient's history were reviewed and updated as appropriate: past social history, past surgical history and problem list.    I have reviewed and agree with the HPI, ROS, and historical information as entered above. Yelitza Williamson, APRN    /82   Wt 96.6 kg (213 lb)   LMP 2020   BMI 35.45 kg/m²       EXAM:     FHT: positive BPM     Urine glucose/protein: See prenatal flowsheet       Assessment and Plan    Problem List Items Addressed This Visit        Endocrine and Metabolic    GDM (gestational diabetes mellitus), class A1    Overview     Needs Diabetes counseling  Growth 32wk, NSTs weekly thereafter            Gravid and     Multigravida of advanced maternal age in second trimester    Overview     Panorama within normal         Pregnancy - Primary    Relevant Orders    POC Urinalysis Dipstick (Completed)          1. Pregnancy at 35w1d  2. Fetal status reassuring. NST reactive.   3. Continue to monitor BS's and diet. Repeat growth  U/S at 37 wks.   Return in about 1 week (around 9/3/2021) for with NST.    Yelitza Williamson, SWETHA  08/27/2021

## 2021-09-03 ENCOUNTER — ROUTINE PRENATAL (OUTPATIENT)
Dept: OBSTETRICS AND GYNECOLOGY | Facility: CLINIC | Age: 36
End: 2021-09-03

## 2021-09-03 VITALS — DIASTOLIC BLOOD PRESSURE: 78 MMHG | SYSTOLIC BLOOD PRESSURE: 116 MMHG | WEIGHT: 214.2 LBS | BODY MASS INDEX: 35.64 KG/M2

## 2021-09-03 DIAGNOSIS — Z3A.36 36 WEEKS GESTATION OF PREGNANCY: Primary | ICD-10-CM

## 2021-09-03 DIAGNOSIS — Z34.83 PRENATAL CARE, SUBSEQUENT PREGNANCY, THIRD TRIMESTER: ICD-10-CM

## 2021-09-03 DIAGNOSIS — O09.522 MULTIGRAVIDA OF ADVANCED MATERNAL AGE IN SECOND TRIMESTER: ICD-10-CM

## 2021-09-03 DIAGNOSIS — O24.410 GDM (GESTATIONAL DIABETES MELLITUS), CLASS A1: ICD-10-CM

## 2021-09-03 LAB
EXPIRATION DATE: 0
GLUCOSE UR STRIP-MCNC: NEGATIVE MG/DL
Lab: 0
PROT UR STRIP-MCNC: NEGATIVE MG/DL

## 2021-09-03 PROCEDURE — 59025 FETAL NON-STRESS TEST: CPT | Performed by: OBSTETRICS & GYNECOLOGY

## 2021-09-03 PROCEDURE — 0502F SUBSEQUENT PRENATAL CARE: CPT | Performed by: OBSTETRICS & GYNECOLOGY

## 2021-09-03 NOTE — PROGRESS NOTES
OB FOLLOW UP    Reina Benitez is a 36 y.o.  36w1d patient being seen today for her obstetrical follow up visit. Patient reports no complaints.     Her prenatal care is complicated by (and status) :    Patient Active Problem List   Diagnosis   • Multigravida of advanced maternal age in second trimester   • Pregnancy   • GDM (gestational diabetes mellitus), class A1   • Nausea & vomiting   • Prenatal care, subsequent pregnancy, third trimester       ROS -   Patient Reports : No Problems  Patient Denies: Loss of Fluid, Vaginal Spotting, Vision Changes, Headaches and Cramping/Contractions   Fetal Movement : normal      /78   Wt 97.2 kg (214 lb 3.2 oz)   LMP 2020   BMI 35.64 kg/m²     Ultrasound Today: no    EXAM:  Vitals: See prenatal flowsheet   Abdomen: See prenatal flowsheet   Urine glucose/protein: See prenatal flowsheet   Pelvic: See prenatal flowsheet     Non Stress Test: minutes >20  non-stress test: NST: Reactive  indication: Diabetes Mellitus Gestational  category: Category I      Assessment    Diagnoses and all orders for this visit:    1. 36 weeks gestation of pregnancy (Primary)  -     POC Glucose, Urine, Qualitative, Dipstick  -     POC Protein, Urine, Qualitative, Dipstick  -     US Ob Follow Up Transabdominal Approach; Future  -     US Fetal Biophysical Profile;Without Non-Stress Testing; Future    2. GDM (gestational diabetes mellitus), class A1    3. Multigravida of advanced maternal age in second trimester    4. Prenatal care, subsequent pregnancy, third trimester  -     Cancel: Group B Streptococcus Culture - Swab, Vaginal/Rectum  -     Group B Streptococcus Culture - Swab, Vaginal/Rectum        1. Pregnancy at 36w1d  2. Fetal status reassuring     Problem List Items Addressed This Visit     Multigravida of advanced maternal age in second trimester    Overview     Panorama within normal         Pregnancy - Primary    Relevant Orders    POC Glucose, Urine, Qualitative,  Dipstick (Completed)    POC Protein, Urine, Qualitative, Dipstick (Completed)    US Ob Follow Up Transabdominal Approach    US Fetal Biophysical Profile;Without Non-Stress Testing    GDM (gestational diabetes mellitus), class A1    Overview     Needs Diabetes counseling  Growth 32wk, NSTs weekly thereafter         Prenatal care, subsequent pregnancy, third trimester    Relevant Orders    Group B Streptococcus Culture - Swab, Vaginal/Rectum          Plan    1. Fetal movement/ Labor Precautions  2. Plan IOL 39wk.  Will get it scheduled.  Patient is on Prenatal vitamins  Activity recommendation : 150 minutes/week of moderate intensity aerobic activity unless we limit for bleeding, hypertension or other pregnancy complication   Reviewed FSBS goals: fasting <90, 2hr PP < 120  Diet/exercise precautions  Follow Up: Return in about 1 week (around 9/10/2021) for WITH SONO.        Jessica Buenrostro MD  2021

## 2021-09-07 LAB — B-HEM STREP SPEC QL CULT: POSITIVE

## 2021-09-10 ENCOUNTER — ROUTINE PRENATAL (OUTPATIENT)
Dept: OBSTETRICS AND GYNECOLOGY | Facility: CLINIC | Age: 36
End: 2021-09-10

## 2021-09-10 VITALS — SYSTOLIC BLOOD PRESSURE: 122 MMHG | DIASTOLIC BLOOD PRESSURE: 80 MMHG | WEIGHT: 213.8 LBS | BODY MASS INDEX: 35.58 KG/M2

## 2021-09-10 DIAGNOSIS — O09.522 MULTIGRAVIDA OF ADVANCED MATERNAL AGE IN SECOND TRIMESTER: ICD-10-CM

## 2021-09-10 DIAGNOSIS — Z3A.37 37 WEEKS GESTATION OF PREGNANCY: Primary | ICD-10-CM

## 2021-09-10 DIAGNOSIS — O24.410 GDM (GESTATIONAL DIABETES MELLITUS), CLASS A1: ICD-10-CM

## 2021-09-10 LAB
EXPIRATION DATE: 0
GLUCOSE UR STRIP-MCNC: NEGATIVE MG/DL
Lab: 0
PROT UR STRIP-MCNC: NEGATIVE MG/DL

## 2021-09-10 PROCEDURE — 0502F SUBSEQUENT PRENATAL CARE: CPT | Performed by: OBSTETRICS & GYNECOLOGY

## 2021-09-10 NOTE — PROGRESS NOTES
OB FOLLOW UP    Reina Benitez is a 36 y.o.  37w1d patient being seen today for her obstetrical follow up visit. Patient reports no complaints.     Her prenatal care is complicated by (and status) :    Patient Active Problem List   Diagnosis   • Multigravida of advanced maternal age in second trimester   • Pregnancy   • GDM (gestational diabetes mellitus), class A1   • Nausea & vomiting   • Prenatal care, subsequent pregnancy, third trimester       ROS -   Patient Reports : No Problems  Patient Denies: Loss of Fluid, Vaginal Spotting, Vision Changes, Headaches and Cramping/Contractions   Fetal Movement : normal      /80   Wt 97 kg (213 lb 12.8 oz)   LMP 2020   BMI 35.58 kg/m²     Ultrasound Today: yes  EXAM:  Vitals: See prenatal flowsheet   Abdomen: See prenatal flowsheet   Urine glucose/protein: See prenatal flowsheet   Pelvic: See prenatal flowsheet   Non Stress Test: minutes >20min  non-stress test: NST: Reactive  indication: Diabetes Mellitus Gestational  category: Category I    Assessment    Diagnoses and all orders for this visit:    1. 37 weeks gestation of pregnancy (Primary)  -     POC Glucose, Urine, Qualitative, Dipstick  -     POC Protein, Urine, Qualitative, Dipstick    2. GDM (gestational diabetes mellitus), class A1    3. Multigravida of advanced maternal age in second trimester        1. Pregnancy at 37w1d  2. Fetal status reassuring     Problem List Items Addressed This Visit     Multigravida of advanced maternal age in second trimester    Overview     Panorama within normal         Pregnancy - Primary    Relevant Orders    POC Glucose, Urine, Qualitative, Dipstick (Completed)    POC Protein, Urine, Qualitative, Dipstick (Completed)    GDM (gestational diabetes mellitus), class A1    Overview     Needs Diabetes counseling  Growth 32wk, NSTs weekly thereafter               Plan    1. Fetal movement/ Labor Precautions  2. We reviewed that breathing noted, but not  tone??  NST beautifully reactive.  Will just repeat at short interval.  Patient encouraged to call for any concerns about movement.  U/S ordered at follow up  Reviewed FSBS goals: fasting <90, 2hr PP < 120  Diet/exercise precautions  Follow Up: Return in about 3 days (around 9/13/2021) for WITH SONO - repeat BPP.        Jessica Buenrostro MD  09/10/2021

## 2021-09-13 ENCOUNTER — HOSPITAL ENCOUNTER (INPATIENT)
Facility: HOSPITAL | Age: 36
LOS: 3 days | Discharge: HOME | End: 2021-09-16
Attending: OBSTETRICS & GYNECOLOGY | Admitting: OBSTETRICS & GYNECOLOGY

## 2021-09-13 ENCOUNTER — ROUTINE PRENATAL (OUTPATIENT)
Dept: OBSTETRICS AND GYNECOLOGY | Facility: CLINIC | Age: 36
End: 2021-09-13

## 2021-09-13 VITALS — WEIGHT: 213.4 LBS | BODY MASS INDEX: 35.51 KG/M2 | DIASTOLIC BLOOD PRESSURE: 82 MMHG | SYSTOLIC BLOOD PRESSURE: 122 MMHG

## 2021-09-13 DIAGNOSIS — Z34.83 PRENATAL CARE, SUBSEQUENT PREGNANCY, THIRD TRIMESTER: ICD-10-CM

## 2021-09-13 DIAGNOSIS — O09.522 MULTIGRAVIDA OF ADVANCED MATERNAL AGE IN SECOND TRIMESTER: ICD-10-CM

## 2021-09-13 DIAGNOSIS — O24.410 GDM (GESTATIONAL DIABETES MELLITUS), CLASS A1: ICD-10-CM

## 2021-09-13 DIAGNOSIS — Z3A.37 37 WEEKS GESTATION OF PREGNANCY: Primary | ICD-10-CM

## 2021-09-13 PROBLEM — Z34.90 PREGNANT: Status: ACTIVE | Noted: 2021-09-13

## 2021-09-13 PROBLEM — R11.2 NAUSEA & VOMITING: Status: RESOLVED | Noted: 2021-08-11 | Resolved: 2021-09-13

## 2021-09-13 LAB
ABO GROUP BLD: NORMAL
ALP SERPL-CCNC: 142 U/L (ref 39–117)
ALT SERPL W P-5'-P-CCNC: 8 U/L (ref 1–33)
AST SERPL-CCNC: 13 U/L (ref 1–32)
BILIRUB SERPL-MCNC: 0.3 MG/DL (ref 0–1.2)
BLD GP AB SCN SERPL QL: NEGATIVE
CLARITY, POC: CLEAR
COLOR UR: YELLOW
CREAT SERPL-MCNC: 0.52 MG/DL (ref 0.57–1)
DEPRECATED RDW RBC AUTO: 44 FL (ref 37–54)
ERYTHROCYTE [DISTWIDTH] IN BLOOD BY AUTOMATED COUNT: 14.5 % (ref 12.3–15.4)
GLUCOSE BLDC GLUCOMTR-MCNC: 78 MG/DL (ref 70–130)
GLUCOSE UR STRIP-MCNC: NEGATIVE MG/DL
HCT VFR BLD AUTO: 35.3 % (ref 34–46.6)
HGB BLD-MCNC: 11.7 G/DL (ref 12–15.9)
LDH SERPL-CCNC: 205 U/L (ref 135–214)
MCH RBC QN AUTO: 27.7 PG (ref 26.6–33)
MCHC RBC AUTO-ENTMCNC: 33.1 G/DL (ref 31.5–35.7)
MCV RBC AUTO: 83.5 FL (ref 79–97)
PLATELET # BLD AUTO: 323 10*3/MM3 (ref 140–450)
PMV BLD AUTO: 10.3 FL (ref 6–12)
PROT UR STRIP-MCNC: NEGATIVE MG/DL
RBC # BLD AUTO: 4.23 10*6/MM3 (ref 3.77–5.28)
RH BLD: POSITIVE
SARS-COV-2 RDRP RESP QL NAA+PROBE: NORMAL
T&S EXPIRATION DATE: NORMAL
URATE SERPL-MCNC: 3.6 MG/DL (ref 2.4–5.7)
WBC # BLD AUTO: 8.18 10*3/MM3 (ref 3.4–10.8)

## 2021-09-13 PROCEDURE — 87635 SARS-COV-2 COVID-19 AMP PRB: CPT | Performed by: OBSTETRICS & GYNECOLOGY

## 2021-09-13 PROCEDURE — 86900 BLOOD TYPING SEROLOGIC ABO: CPT | Performed by: OBSTETRICS & GYNECOLOGY

## 2021-09-13 PROCEDURE — 59200 INSERT CERVICAL DILATOR: CPT | Performed by: OBSTETRICS & GYNECOLOGY

## 2021-09-13 PROCEDURE — 82962 GLUCOSE BLOOD TEST: CPT

## 2021-09-13 PROCEDURE — 83615 LACTATE (LD) (LDH) ENZYME: CPT | Performed by: OBSTETRICS & GYNECOLOGY

## 2021-09-13 PROCEDURE — 86901 BLOOD TYPING SEROLOGIC RH(D): CPT | Performed by: OBSTETRICS & GYNECOLOGY

## 2021-09-13 PROCEDURE — 85027 COMPLETE CBC AUTOMATED: CPT | Performed by: OBSTETRICS & GYNECOLOGY

## 2021-09-13 PROCEDURE — 86850 RBC ANTIBODY SCREEN: CPT | Performed by: OBSTETRICS & GYNECOLOGY

## 2021-09-13 PROCEDURE — 84450 TRANSFERASE (AST) (SGOT): CPT | Performed by: OBSTETRICS & GYNECOLOGY

## 2021-09-13 PROCEDURE — 25010000002 ONDANSETRON PER 1 MG: Performed by: OBSTETRICS & GYNECOLOGY

## 2021-09-13 PROCEDURE — 82565 ASSAY OF CREATININE: CPT | Performed by: OBSTETRICS & GYNECOLOGY

## 2021-09-13 PROCEDURE — 0502F SUBSEQUENT PRENATAL CARE: CPT | Performed by: OBSTETRICS & GYNECOLOGY

## 2021-09-13 PROCEDURE — 59025 FETAL NON-STRESS TEST: CPT

## 2021-09-13 PROCEDURE — 84550 ASSAY OF BLOOD/URIC ACID: CPT | Performed by: OBSTETRICS & GYNECOLOGY

## 2021-09-13 PROCEDURE — 84075 ASSAY ALKALINE PHOSPHATASE: CPT | Performed by: OBSTETRICS & GYNECOLOGY

## 2021-09-13 PROCEDURE — 82247 BILIRUBIN TOTAL: CPT | Performed by: OBSTETRICS & GYNECOLOGY

## 2021-09-13 PROCEDURE — 0HQ9XZZ REPAIR PERINEUM SKIN, EXTERNAL APPROACH: ICD-10-PCS | Performed by: OBSTETRICS & GYNECOLOGY

## 2021-09-13 PROCEDURE — 84460 ALANINE AMINO (ALT) (SGPT): CPT | Performed by: OBSTETRICS & GYNECOLOGY

## 2021-09-13 PROCEDURE — 25010000002 BUTORPHANOL PER 1 MG: Performed by: OBSTETRICS & GYNECOLOGY

## 2021-09-13 PROCEDURE — 25010000002 PENICILLIN G POTASSIUM PER 600000 UNITS: Performed by: OBSTETRICS & GYNECOLOGY

## 2021-09-13 RX ORDER — SODIUM CHLORIDE 0.9 % (FLUSH) 0.9 %
3 SYRINGE (ML) INJECTION EVERY 12 HOURS SCHEDULED
Status: DISCONTINUED | OUTPATIENT
Start: 2021-09-13 | End: 2021-09-14 | Stop reason: HOSPADM

## 2021-09-13 RX ORDER — BUTORPHANOL TARTRATE 1 MG/ML
1 INJECTION, SOLUTION INTRAMUSCULAR; INTRAVENOUS
Status: DISCONTINUED | OUTPATIENT
Start: 2021-09-13 | End: 2021-09-14 | Stop reason: HOSPADM

## 2021-09-13 RX ORDER — EPHEDRINE SULFATE 5 MG/ML
INJECTION INTRAVENOUS
Status: DISCONTINUED
Start: 2021-09-13 | End: 2021-09-16 | Stop reason: HOSPADM

## 2021-09-13 RX ORDER — ERYTHROMYCIN 5 MG/G
OINTMENT OPHTHALMIC
Status: DISCONTINUED
Start: 2021-09-13 | End: 2021-09-16 | Stop reason: HOSPADM

## 2021-09-13 RX ORDER — PENICILLIN G 3000000 [IU]/50ML
3 INJECTION, SOLUTION INTRAVENOUS EVERY 4 HOURS
Status: DISCONTINUED | OUTPATIENT
Start: 2021-09-13 | End: 2021-09-14 | Stop reason: HOSPADM

## 2021-09-13 RX ORDER — OXYTOCIN-SODIUM CHLORIDE 0.9% IV SOLN 30 UNIT/500ML 30-0.9/5 UT/ML-%
2-20 SOLUTION INTRAVENOUS
Status: DISCONTINUED | OUTPATIENT
Start: 2021-09-14 | End: 2021-09-16 | Stop reason: HOSPADM

## 2021-09-13 RX ORDER — ONDANSETRON 2 MG/ML
4 INJECTION INTRAMUSCULAR; INTRAVENOUS EVERY 6 HOURS PRN
Status: DISCONTINUED | OUTPATIENT
Start: 2021-09-13 | End: 2021-09-14 | Stop reason: HOSPADM

## 2021-09-13 RX ORDER — SODIUM CHLORIDE 0.9 % (FLUSH) 0.9 %
3-10 SYRINGE (ML) INJECTION AS NEEDED
Status: DISCONTINUED | OUTPATIENT
Start: 2021-09-13 | End: 2021-09-14 | Stop reason: HOSPADM

## 2021-09-13 RX ORDER — MAGNESIUM CARB/ALUMINUM HYDROX 105-160MG
30 TABLET,CHEWABLE ORAL ONCE
Status: DISCONTINUED | OUTPATIENT
Start: 2021-09-13 | End: 2021-09-14 | Stop reason: HOSPADM

## 2021-09-13 RX ORDER — PENICILLIN G 3000000 [IU]/50ML
3 INJECTION, SOLUTION INTRAVENOUS EVERY 4 HOURS
Status: DISCONTINUED | OUTPATIENT
Start: 2021-09-14 | End: 2021-09-13

## 2021-09-13 RX ORDER — SODIUM CHLORIDE, SODIUM LACTATE, POTASSIUM CHLORIDE, CALCIUM CHLORIDE 600; 310; 30; 20 MG/100ML; MG/100ML; MG/100ML; MG/100ML
125 INJECTION, SOLUTION INTRAVENOUS CONTINUOUS
Status: DISCONTINUED | OUTPATIENT
Start: 2021-09-13 | End: 2021-09-16 | Stop reason: HOSPADM

## 2021-09-13 RX ORDER — ZOLPIDEM TARTRATE 5 MG/1
5 TABLET ORAL NIGHTLY PRN
Status: DISCONTINUED | OUTPATIENT
Start: 2021-09-13 | End: 2021-09-16 | Stop reason: HOSPADM

## 2021-09-13 RX ORDER — LIDOCAINE HYDROCHLORIDE 10 MG/ML
5 INJECTION, SOLUTION EPIDURAL; INFILTRATION; INTRACAUDAL; PERINEURAL AS NEEDED
Status: DISCONTINUED | OUTPATIENT
Start: 2021-09-13 | End: 2021-09-14 | Stop reason: HOSPADM

## 2021-09-13 RX ORDER — ONDANSETRON 4 MG/1
4 TABLET, FILM COATED ORAL EVERY 6 HOURS PRN
Status: DISCONTINUED | OUTPATIENT
Start: 2021-09-13 | End: 2021-09-14 | Stop reason: HOSPADM

## 2021-09-13 RX ADMIN — SODIUM CHLORIDE, POTASSIUM CHLORIDE, SODIUM LACTATE AND CALCIUM CHLORIDE 125 ML/HR: 600; 310; 30; 20 INJECTION, SOLUTION INTRAVENOUS at 13:50

## 2021-09-13 RX ADMIN — BUTORPHANOL TARTRATE 2 MG: 2 INJECTION, SOLUTION INTRAMUSCULAR; INTRAVENOUS at 14:54

## 2021-09-13 RX ADMIN — ONDANSETRON 4 MG: 2 INJECTION INTRAMUSCULAR; INTRAVENOUS at 20:59

## 2021-09-13 RX ADMIN — PENICILLIN G 3 MILLION UNITS: 3000000 INJECTION, SOLUTION INTRAVENOUS at 23:02

## 2021-09-13 RX ADMIN — SODIUM CHLORIDE 5 MILLION UNITS: 900 INJECTION INTRAVENOUS at 18:46

## 2021-09-13 RX ADMIN — ZOLPIDEM TARTRATE 5 MG: 5 TABLET ORAL at 23:11

## 2021-09-13 NOTE — PROCEDURES
36 y.o.  OB History        4    Para   2    Term   1       0    AB   1    Living   1       SAB   1    TAB   0    Ectopic   0    Molar   0    Multiple   0    Live Births   1             Presents at 37 4/7 weeks as an induction of labour due to Non-reassuring BPP   Her primary OB requests a Mora Bulb placement to initiate the induction of labour.    Fetal Heart Rate Assessment   Method:     Beats/min:     Baseline:  140s   Varibility:  mod   Accels:  y   Decels:  n   Tracing Category:  1     TOCO:  None   SVE: FT/80/-2    A Mora Bulb was placed without difficulties with 60 cc of sterile saline.  The patient tolerated the procedure well.

## 2021-09-13 NOTE — PLAN OF CARE
Problem: Adult Inpatient Plan of Care  Goal: Plan of Care Review  Outcome: Ongoing, Progressing  Goal: Patient-Specific Goal (Individualized)  Outcome: Ongoing, Progressing  Goal: Absence of Hospital-Acquired Illness or Injury  Outcome: Ongoing, Progressing  Goal: Optimal Comfort and Wellbeing  Outcome: Ongoing, Progressing  Goal: Readiness for Transition of Care  Outcome: Ongoing, Progressing   Goal Outcome Evaluation:

## 2021-09-13 NOTE — PROGRESS NOTES
OB FOLLOW UP  CC- Here for care of pregnancy     Reina Benitez is a 36 y.o.  37w4d patient being seen today for her obstetrical follow up visit. Patient reports no complaints.     Her prenatal care is complicated by (and status) :    Patient Active Problem List   Diagnosis   • Multigravida of advanced maternal age in second trimester   • Pregnancy   • GDM (gestational diabetes mellitus), class A1   • Prenatal care, subsequent pregnancy, third trimester       The additional following portions of the patient's history were reviewed and updated as appropriate: allergies and current medications.    ROS -   Patient Reports : No Problems  Patient Denies: Loss of Fluid, Vaginal Spotting, Vision Changes, Headaches and Cramping/Contractions   Fetal Movement : normal    I have reviewed and agree with the HPI, ROS, and historical information as entered above. Jessica Buenrostro MD    /82   Wt 96.8 kg (213 lb 6.4 oz)   LMP 2020   BMI 35.51 kg/m²     Ultrasound Today: yes    EXAM:  Vitals: See prenatal flowsheet   Abdomen: See prenatal flowsheet   Urine glucose/protein: See prenatal flowsheet   HRT: See prenatal flowsheet   Presentation: See prenatal flowsheet   Pelvic: Yes: see flowsheet     GBS Status: pos    Patient Active Problem List   Diagnosis   • Multigravida of advanced maternal age in second trimester   • Pregnancy   • GDM (gestational diabetes mellitus), class A1   • Prenatal care, subsequent pregnancy, third trimester       Assessment and Plan    Problem List Items Addressed This Visit     Multigravida of advanced maternal age in second trimester    Overview     Panorama within normal         Pregnancy - Primary    Relevant Orders    POC Urinalysis Dipstick (Completed)    GDM (gestational diabetes mellitus), class A1    Overview     Needs Diabetes counseling  Growth 32wk, NSTs weekly thereafter         Prenatal care, subsequent pregnancy, third trimester          1. Pregnancy at  37w4d  2. Fetal status reassuring.     Plan:    1. BPP 4/8, 2 off for breathing and tone  2. Sent to L&D for IOL for nonreassuring fetal status.  Hopefully tracing will look as good as it did Friday when she gets downstairs.      Jessica Buenrostro MD  09/13/2021

## 2021-09-14 ENCOUNTER — ANESTHESIA (OUTPATIENT)
Dept: LABOR AND DELIVERY | Facility: HOSPITAL | Age: 36
End: 2021-09-14

## 2021-09-14 ENCOUNTER — ANESTHESIA EVENT (OUTPATIENT)
Dept: LABOR AND DELIVERY | Facility: HOSPITAL | Age: 36
End: 2021-09-14

## 2021-09-14 LAB
GLUCOSE BLDC GLUCOMTR-MCNC: 109 MG/DL (ref 70–130)
GLUCOSE BLDC GLUCOMTR-MCNC: 83 MG/DL (ref 70–130)
GLUCOSE BLDC GLUCOMTR-MCNC: 86 MG/DL (ref 70–130)
GLUCOSE BLDC GLUCOMTR-MCNC: 87 MG/DL (ref 70–130)

## 2021-09-14 PROCEDURE — 51703 INSERT BLADDER CATH COMPLEX: CPT

## 2021-09-14 PROCEDURE — 82962 GLUCOSE BLOOD TEST: CPT

## 2021-09-14 PROCEDURE — 25010000002 PENICILLIN G POTASSIUM PER 600000 UNITS: Performed by: OBSTETRICS & GYNECOLOGY

## 2021-09-14 PROCEDURE — 25010000002 ONDANSETRON PER 1 MG: Performed by: ANESTHESIOLOGY

## 2021-09-14 PROCEDURE — 25010000002 FENTANYL CITRATE (PF) 50 MCG/ML SOLUTION: Performed by: ANESTHESIOLOGY

## 2021-09-14 PROCEDURE — 25010000002 ROPIVACAINE PER 1 MG: Performed by: ANESTHESIOLOGY

## 2021-09-14 PROCEDURE — 59025 FETAL NON-STRESS TEST: CPT

## 2021-09-14 PROCEDURE — C1755 CATHETER, INTRASPINAL: HCPCS

## 2021-09-14 PROCEDURE — 59400 OBSTETRICAL CARE: CPT | Performed by: OBSTETRICS & GYNECOLOGY

## 2021-09-14 PROCEDURE — C1755 CATHETER, INTRASPINAL: HCPCS | Performed by: ANESTHESIOLOGY

## 2021-09-14 RX ORDER — DOCUSATE SODIUM 100 MG/1
100 CAPSULE, LIQUID FILLED ORAL 2 TIMES DAILY
Status: DISCONTINUED | OUTPATIENT
Start: 2021-09-14 | End: 2021-09-16 | Stop reason: HOSPADM

## 2021-09-14 RX ORDER — SODIUM CHLORIDE 0.9 % (FLUSH) 0.9 %
1-10 SYRINGE (ML) INJECTION AS NEEDED
Status: DISCONTINUED | OUTPATIENT
Start: 2021-09-14 | End: 2021-09-16 | Stop reason: HOSPADM

## 2021-09-14 RX ORDER — OXYTOCIN-SODIUM CHLORIDE 0.9% IV SOLN 30 UNIT/500ML 30-0.9/5 UT/ML-%
85 SOLUTION INTRAVENOUS ONCE
Status: COMPLETED | OUTPATIENT
Start: 2021-09-14 | End: 2021-09-14

## 2021-09-14 RX ORDER — FAMOTIDINE 10 MG/ML
20 INJECTION, SOLUTION INTRAVENOUS ONCE AS NEEDED
Status: DISCONTINUED | OUTPATIENT
Start: 2021-09-14 | End: 2021-09-14 | Stop reason: HOSPADM

## 2021-09-14 RX ORDER — LIDOCAINE HYDROCHLORIDE AND EPINEPHRINE 15; 5 MG/ML; UG/ML
INJECTION, SOLUTION EPIDURAL AS NEEDED
Status: DISCONTINUED | OUTPATIENT
Start: 2021-09-14 | End: 2021-09-14 | Stop reason: SURG

## 2021-09-14 RX ORDER — ACETAMINOPHEN 325 MG/1
650 TABLET ORAL EVERY 4 HOURS PRN
Status: DISCONTINUED | OUTPATIENT
Start: 2021-09-14 | End: 2021-09-14 | Stop reason: HOSPADM

## 2021-09-14 RX ORDER — CARBOPROST TROMETHAMINE 250 UG/ML
250 INJECTION, SOLUTION INTRAMUSCULAR AS NEEDED
Status: DISCONTINUED | OUTPATIENT
Start: 2021-09-14 | End: 2021-09-14 | Stop reason: HOSPADM

## 2021-09-14 RX ORDER — HYDROCORTISONE 25 MG/G
1 CREAM TOPICAL AS NEEDED
Status: DISCONTINUED | OUTPATIENT
Start: 2021-09-14 | End: 2021-09-16 | Stop reason: HOSPADM

## 2021-09-14 RX ORDER — BISACODYL 10 MG
10 SUPPOSITORY, RECTAL RECTAL DAILY PRN
Status: DISCONTINUED | OUTPATIENT
Start: 2021-09-15 | End: 2021-09-16 | Stop reason: HOSPADM

## 2021-09-14 RX ORDER — METHYLERGONOVINE MALEATE 0.2 MG/ML
200 INJECTION INTRAVENOUS ONCE AS NEEDED
Status: DISCONTINUED | OUTPATIENT
Start: 2021-09-14 | End: 2021-09-14 | Stop reason: HOSPADM

## 2021-09-14 RX ORDER — METOCLOPRAMIDE HYDROCHLORIDE 5 MG/ML
10 INJECTION INTRAMUSCULAR; INTRAVENOUS ONCE AS NEEDED
Status: DISCONTINUED | OUTPATIENT
Start: 2021-09-14 | End: 2021-09-14 | Stop reason: HOSPADM

## 2021-09-14 RX ORDER — OXYTOCIN-SODIUM CHLORIDE 0.9% IV SOLN 30 UNIT/500ML 30-0.9/5 UT/ML-%
650 SOLUTION INTRAVENOUS ONCE
Status: COMPLETED | OUTPATIENT
Start: 2021-09-14 | End: 2021-09-14

## 2021-09-14 RX ORDER — IBUPROFEN 600 MG/1
600 TABLET ORAL EVERY 6 HOURS PRN
Status: DISCONTINUED | OUTPATIENT
Start: 2021-09-14 | End: 2021-09-14 | Stop reason: HOSPADM

## 2021-09-14 RX ORDER — ROPIVACAINE HYDROCHLORIDE 2 MG/ML
15 INJECTION, SOLUTION EPIDURAL; INFILTRATION; PERINEURAL CONTINUOUS
Status: DISCONTINUED | OUTPATIENT
Start: 2021-09-14 | End: 2021-09-16 | Stop reason: HOSPADM

## 2021-09-14 RX ORDER — FENTANYL CITRATE 50 UG/ML
INJECTION, SOLUTION INTRAMUSCULAR; INTRAVENOUS AS NEEDED
Status: DISCONTINUED | OUTPATIENT
Start: 2021-09-14 | End: 2021-09-14 | Stop reason: SURG

## 2021-09-14 RX ORDER — MISOPROSTOL 200 UG/1
600 TABLET ORAL AS NEEDED
Status: DISCONTINUED | OUTPATIENT
Start: 2021-09-14 | End: 2021-09-16 | Stop reason: HOSPADM

## 2021-09-14 RX ORDER — LANOLIN
CREAM (ML) TOPICAL
Status: DISCONTINUED | OUTPATIENT
Start: 2021-09-14 | End: 2021-09-16 | Stop reason: HOSPADM

## 2021-09-14 RX ORDER — BUPIVACAINE HYDROCHLORIDE 2.5 MG/ML
INJECTION, SOLUTION EPIDURAL; INFILTRATION; INTRACAUDAL AS NEEDED
Status: DISCONTINUED | OUTPATIENT
Start: 2021-09-14 | End: 2021-09-14 | Stop reason: SURG

## 2021-09-14 RX ORDER — EPHEDRINE SULFATE 5 MG/ML
10 INJECTION INTRAVENOUS
Status: DISCONTINUED | OUTPATIENT
Start: 2021-09-14 | End: 2021-09-14 | Stop reason: HOSPADM

## 2021-09-14 RX ORDER — MISOPROSTOL 200 UG/1
800 TABLET ORAL AS NEEDED
Status: DISCONTINUED | OUTPATIENT
Start: 2021-09-14 | End: 2021-09-14 | Stop reason: HOSPADM

## 2021-09-14 RX ORDER — DIPHENHYDRAMINE HYDROCHLORIDE 50 MG/ML
12.5 INJECTION INTRAMUSCULAR; INTRAVENOUS EVERY 8 HOURS PRN
Status: DISCONTINUED | OUTPATIENT
Start: 2021-09-14 | End: 2021-09-14 | Stop reason: HOSPADM

## 2021-09-14 RX ORDER — TRISODIUM CITRATE DIHYDRATE AND CITRIC ACID MONOHYDRATE 500; 334 MG/5ML; MG/5ML
30 SOLUTION ORAL ONCE
Status: DISCONTINUED | OUTPATIENT
Start: 2021-09-14 | End: 2021-09-14 | Stop reason: HOSPADM

## 2021-09-14 RX ORDER — IBUPROFEN 600 MG/1
600 TABLET ORAL EVERY 6 HOURS PRN
Status: DISCONTINUED | OUTPATIENT
Start: 2021-09-14 | End: 2021-09-16 | Stop reason: HOSPADM

## 2021-09-14 RX ORDER — ONDANSETRON 2 MG/ML
4 INJECTION INTRAMUSCULAR; INTRAVENOUS ONCE AS NEEDED
Status: COMPLETED | OUTPATIENT
Start: 2021-09-14 | End: 2021-09-14

## 2021-09-14 RX ADMIN — PENICILLIN G 3 MILLION UNITS: 3000000 INJECTION, SOLUTION INTRAVENOUS at 02:59

## 2021-09-14 RX ADMIN — PENICILLIN G 3 MILLION UNITS: 3000000 INJECTION, SOLUTION INTRAVENOUS at 06:58

## 2021-09-14 RX ADMIN — EPHEDRINE SULFATE 10 MG: 5 INJECTION INTRAVENOUS at 10:48

## 2021-09-14 RX ADMIN — BUPIVACAINE HYDROCHLORIDE 12 ML: 2.5 INJECTION, SOLUTION EPIDURAL; INFILTRATION; INTRACAUDAL; PERINEURAL at 10:32

## 2021-09-14 RX ADMIN — SODIUM CHLORIDE, POTASSIUM CHLORIDE, SODIUM LACTATE AND CALCIUM CHLORIDE 1000 ML: 600; 310; 30; 20 INJECTION, SOLUTION INTRAVENOUS at 09:53

## 2021-09-14 RX ADMIN — SODIUM CHLORIDE, POTASSIUM CHLORIDE, SODIUM LACTATE AND CALCIUM CHLORIDE 125 ML/HR: 600; 310; 30; 20 INJECTION, SOLUTION INTRAVENOUS at 03:01

## 2021-09-14 RX ADMIN — LIDOCAINE HYDROCHLORIDE AND EPINEPHRINE 2 ML: 15; 5 INJECTION, SOLUTION EPIDURAL at 10:31

## 2021-09-14 RX ADMIN — LIDOCAINE HYDROCHLORIDE AND EPINEPHRINE 3 ML: 15; 5 INJECTION, SOLUTION EPIDURAL at 10:30

## 2021-09-14 RX ADMIN — ONDANSETRON 4 MG: 2 INJECTION INTRAMUSCULAR; INTRAVENOUS at 10:53

## 2021-09-14 RX ADMIN — DOCUSATE SODIUM 100 MG: 100 CAPSULE, LIQUID FILLED ORAL at 20:29

## 2021-09-14 RX ADMIN — IBUPROFEN 600 MG: 600 TABLET ORAL at 23:06

## 2021-09-14 RX ADMIN — Medication: at 15:49

## 2021-09-14 RX ADMIN — OXYTOCIN 85 ML/HR: 10 INJECTION INTRAVENOUS at 13:20

## 2021-09-14 RX ADMIN — ROPIVACAINE HYDROCHLORIDE 16 ML/HR: 2 INJECTION, SOLUTION EPIDURAL; INFILTRATION at 10:37

## 2021-09-14 RX ADMIN — FENTANYL CITRATE 100 MCG: 50 INJECTION, SOLUTION INTRAMUSCULAR; INTRAVENOUS at 10:32

## 2021-09-14 RX ADMIN — IBUPROFEN 600 MG: 600 TABLET ORAL at 17:00

## 2021-09-14 RX ADMIN — OXYTOCIN 2 MILLI-UNITS/MIN: 10 INJECTION INTRAVENOUS at 05:00

## 2021-09-14 RX ADMIN — OXYTOCIN 650 ML/HR: 10 INJECTION INTRAVENOUS at 12:45

## 2021-09-14 RX ADMIN — PENICILLIN G 3 MILLION UNITS: 3000000 INJECTION, SOLUTION INTRAVENOUS at 11:20

## 2021-09-14 RX ADMIN — Medication 1 PAD: at 15:49

## 2021-09-14 NOTE — PROGRESS NOTES
Patient feeling contractions more    , mod-marked robert, no decels, huge accels  Ctx's regular  Pit @ 12    FSBS's 80's, will change to q2hr now.    CE /-2 still  AROM, clear fluid    -expect   -patient will want epidural    Jessica Buenrostro MD  21            FHT since has remained same but baseline now 150.  Will continue to monitor.    Jessica Buenrostro MD  21  10:06 EDT

## 2021-09-14 NOTE — ANESTHESIA PROCEDURE NOTES
Labor Epidural      Patient reassessed immediately prior to procedure    Patient location during procedure: OB  Performed By  Anesthesiologist: Matthew Woodruff DO  Preanesthetic Checklist  Completed: patient identified, IV checked, site marked, risks and benefits discussed, surgical consent, monitors and equipment checked, pre-op evaluation and timeout performed  Prep:  Pt Position:sitting  Sterile Tech:gloves, mask, sterile barrier and cap  Prep:chlorhexidine gluconate and isopropyl alcohol  Monitoring:blood pressure monitoring and continuous pulse oximetry  Epidural Block Procedure:  Approach:midline  Guidance:landmark technique and palpation technique  Location:L3-L4  Needle Type:Tuohy  Needle Gauge:17 G  Loss of Resistance Medium: air  Loss of Resistance: 6cm  Cath Depth at skin:11 cm  Paresthesia: none  Aspiration:negative  Test Dose:negative  Number of Attempts: 1  Post Assessment:  Dressing:secured with tape and occlusive dressing applied (Tegaderm Placed)  Pt Tolerance:patient tolerated the procedure well with no apparent complications  Complications:no

## 2021-09-14 NOTE — L&D DELIVERY NOTE
2021    Patient:Reina Benitez    MR#:[id number]    Vaginal Delivery Note  36 y.o. yo female  at 37w5d    Patient Active Problem List   Diagnosis   • Multigravida of advanced maternal age in second trimester   • Pregnancy   • GDM (gestational diabetes mellitus), class A1   • Prenatal care, subsequent pregnancy, third trimester   • Pregnant       Delivery     Delivery: Vaginal, Spontaneous     YOB: 2023    Time of Birth: 12:43 PM      Anesthesia: Epidural     Delivering clinician: Jessica Buenrostro    Forceps?   No   Vacuum? No    Shoulder dystocia present: No          Infant    Findings: female  infant     Infant observations: Weight: 3075 g (6 lb 12.5 oz)     Observations/Comments:        Apgars: 7  @ 1 minute /    8  @ 5 minutes         Placenta, Cord, and Fluid    Placenta delivered  Spontaneous  at  2021 12:50 PM     Cord: 3 vessels  present.   Nuchal Cord?  yes; Number of nuchal loops present:  1    Cord blood obtained: Yes    Cord gases obtained:  No    Cord gas results: Pending         Repair    Episiotomy: No   Lacerations: Yes  Laceration Information  Laceration Repaired?   Perineal: 1st  Yes    Periurethral:       Labial:       Sulcus:       Vaginal:       Cervical:         Suture used for repair: 3-0 Vicryl   Estimated Blood Loss: 150  mls.         Complications  none    Disposition  Mother to Mother Baby/Postpartum  in stable condition currently.  Baby to remains with mom  in stable condition currently.    Description of Delivery  Patient pushed well x ~15 min and delivered easily from OP presentation.  There was a nuchal x 1, reduced at perineum.  Shoulders delivered easily. Nose and mouth was bulb suctioned and baby was placed on mother's abdomen.  Cord was milked,  clamped and cut.  1st degree Repair was done next.  Placenta delivered easily and with pitocin bolus, there was no uterine atony.       Jessica Buenrostro MD  21  13:22 EDT

## 2021-09-14 NOTE — ANESTHESIA PREPROCEDURE EVALUATION
Anesthesia Evaluation     Patient summary reviewed and Nursing notes reviewed   NPO Solid Status: > 6 hours  NPO Liquid Status: < 2 hours           Airway   Mallampati: II  TM distance: >3 FB  Neck ROM: full  No difficulty expected  Dental      Pulmonary - negative pulmonary ROS   Cardiovascular - negative cardio ROS        Neuro/Psych  (+) psychiatric history Bipolar, Depression and ADHD,     GI/Hepatic/Renal/Endo    (+)   diabetes mellitus gestational,     Musculoskeletal (-) negative ROS    Abdominal    Substance History - negative use     OB/GYN    (+) Pregnant,         Other                        Anesthesia Plan    ASA 2     epidural       Anesthetic plan, all risks, benefits, and alternatives have been provided, discussed and informed consent has been obtained with: patient.  Use of blood products discussed with patient .

## 2021-09-15 LAB
BASOPHILS # BLD AUTO: 0.01 10*3/MM3 (ref 0–0.2)
BASOPHILS NFR BLD AUTO: 0.1 % (ref 0–1.5)
DEPRECATED RDW RBC AUTO: 44.2 FL (ref 37–54)
EOSINOPHIL # BLD AUTO: 0.04 10*3/MM3 (ref 0–0.4)
EOSINOPHIL NFR BLD AUTO: 0.4 % (ref 0.3–6.2)
ERYTHROCYTE [DISTWIDTH] IN BLOOD BY AUTOMATED COUNT: 14.4 % (ref 12.3–15.4)
HCT VFR BLD AUTO: 31.8 % (ref 34–46.6)
HGB BLD-MCNC: 10.5 G/DL (ref 12–15.9)
IMM GRANULOCYTES # BLD AUTO: 0.04 10*3/MM3 (ref 0–0.05)
IMM GRANULOCYTES NFR BLD AUTO: 0.4 % (ref 0–0.5)
LYMPHOCYTES # BLD AUTO: 2.47 10*3/MM3 (ref 0.7–3.1)
LYMPHOCYTES NFR BLD AUTO: 22.5 % (ref 19.6–45.3)
MCH RBC QN AUTO: 27.7 PG (ref 26.6–33)
MCHC RBC AUTO-ENTMCNC: 33 G/DL (ref 31.5–35.7)
MCV RBC AUTO: 83.9 FL (ref 79–97)
MONOCYTES # BLD AUTO: 1.1 10*3/MM3 (ref 0.1–0.9)
MONOCYTES NFR BLD AUTO: 10 % (ref 5–12)
NEUTROPHILS NFR BLD AUTO: 66.6 % (ref 42.7–76)
NEUTROPHILS NFR BLD AUTO: 7.32 10*3/MM3 (ref 1.7–7)
NRBC BLD AUTO-RTO: 0 /100 WBC (ref 0–0.2)
PLATELET # BLD AUTO: 320 10*3/MM3 (ref 140–450)
PMV BLD AUTO: 10.5 FL (ref 6–12)
RBC # BLD AUTO: 3.79 10*6/MM3 (ref 3.77–5.28)
WBC # BLD AUTO: 10.98 10*3/MM3 (ref 3.4–10.8)

## 2021-09-15 PROCEDURE — 85025 COMPLETE CBC W/AUTO DIFF WBC: CPT | Performed by: OBSTETRICS & GYNECOLOGY

## 2021-09-15 PROCEDURE — 0503F POSTPARTUM CARE VISIT: CPT | Performed by: NURSE PRACTITIONER

## 2021-09-15 RX ADMIN — DOCUSATE SODIUM 100 MG: 100 CAPSULE, LIQUID FILLED ORAL at 21:53

## 2021-09-15 RX ADMIN — IBUPROFEN 600 MG: 600 TABLET ORAL at 15:39

## 2021-09-15 RX ADMIN — DOCUSATE SODIUM 100 MG: 100 CAPSULE, LIQUID FILLED ORAL at 08:45

## 2021-09-15 RX ADMIN — IBUPROFEN 600 MG: 600 TABLET ORAL at 21:53

## 2021-09-15 RX ADMIN — IBUPROFEN 600 MG: 600 TABLET ORAL at 08:45

## 2021-09-15 NOTE — PROGRESS NOTES
9/15/2021  PPD #1    Subjective   Reina feels well.  Patient describes her lochia less than menses.  Pain is well controlled       Objective   Temp: Temp:  [97.7 °F (36.5 °C)-98.4 °F (36.9 °C)] 97.7 °F (36.5 °C) Temp src: Axillary   BP: BP: (106-136)/(56-78) 121/77        Pulse: Heart Rate:  [] 79  RR: Resp:  [16-22] 18    General:  No acute distress   Abdomen: Fundus firm and beneath umbilicus   Pelvis: deferred     Lab Results   Component Value Date    WBC 10.98 (H) 09/15/2021    HGB 10.5 (L) 09/15/2021    HCT 31.8 (L) 09/15/2021    MCV 83.9 09/15/2021     09/15/2021    HEPBSAG Negative 03/05/2021       Assessment  1. PPD# 1 after vaginal delivery   2. Infant girl, doing well.    Plan  1. Routine postpartum care.      This note has been electronically signed.    Shruthi Fox, APRN  September 15, 2021

## 2021-09-15 NOTE — ANESTHESIA POSTPROCEDURE EVALUATION
Patient: Reina Benitez    Procedure Summary     Date: 09/14/21 Room / Location:     Anesthesia Start: 1011 Anesthesia Stop: 1250    Procedure: LABOR ANALGESIA Diagnosis:     Scheduled Providers:  Provider: Matthew Woodruff DO    Anesthesia Type: epidural ASA Status: 2          Anesthesia Type: epidural    Vitals  Vitals Value Taken Time   /77 09/15/21 0800   Temp 97.7 °F (36.5 °C) 09/15/21 0800   Pulse 79 09/15/21 0800   Resp 18 09/15/21 0800   SpO2 100 % 09/14/21 1038   Vitals shown include unvalidated device data.        Post Anesthesia Care and Evaluation    Patient location during evaluation: bedside  Patient participation: complete - patient participated  Level of consciousness: awake and alert  Pain management: adequate  Airway patency: patent  Anesthetic complications: No anesthetic complications    Cardiovascular status: acceptable  Respiratory status: acceptable  Hydration status: acceptable  Post Neuraxial Block status: Motor and sensory function returned to baseline and No signs or symptoms of PDPH

## 2021-09-15 NOTE — LACTATION NOTE
"Mother's 2nd baby, nursed 1st for 2 years. Reports this baby is NW. Instructed in importance of skin to skin. Encouraged and instructed importance of waking infant and attempting to nurse every 2-3hrs. Instructed waking techniques. Instructed presence of and importance of colostrum.  Instructed in ss adq latch and suck including ss complications to report. Instructed in ss adq infant intake. Given and reviewed \"Breastfeeding is Going Well When/ Not Going Well\". Given and reviewed,\"Baby's 2nd Day/Night\". Verified mother has pump for DC. To call if need or concern. VU  "

## 2021-09-16 VITALS
RESPIRATION RATE: 16 BRPM | SYSTOLIC BLOOD PRESSURE: 115 MMHG | DIASTOLIC BLOOD PRESSURE: 74 MMHG | TEMPERATURE: 98.5 F | HEART RATE: 73 BPM

## 2021-09-16 PROCEDURE — 0503F POSTPARTUM CARE VISIT: CPT | Performed by: NURSE PRACTITIONER

## 2021-09-16 RX ORDER — IBUPROFEN 600 MG/1
600 TABLET ORAL EVERY 6 HOURS PRN
Qty: 30 TABLET | Refills: 0 | Status: SHIPPED | OUTPATIENT
Start: 2021-09-16

## 2021-09-16 RX ADMIN — DOCUSATE SODIUM 100 MG: 100 CAPSULE, LIQUID FILLED ORAL at 09:27

## 2021-09-16 RX ADMIN — IBUPROFEN 600 MG: 600 TABLET ORAL at 09:27

## 2021-09-16 NOTE — DISCHARGE SUMMARY
Discharge Summary    Date of Admission: 2021  Date of Discharge:  2021      Patient: Reina Benitez      MR#:[id number]    Delivery Provider: Jessica Buenrostro    Presenting Problem/History of Present Illness  Pregnant [Z34.90]     Patient Active Problem List   Diagnosis   • Multigravida of advanced maternal age in second trimester   • Pregnancy   • GDM (gestational diabetes mellitus), class A1   • Prenatal care, subsequent pregnancy, third trimester   • Pregnant         Discharge Diagnosis: Vaginal delivery at 37w5d    Procedures:  Vaginal, Spontaneous     2021    12:43 PM        Discharge Date: 2021;     Hospital Course  Patient is a 36 y.o. female  at 37w5d status post vaginal delivery without complication. Postpartum the patient did well. She remained afebrile, with vital signs stable. She was ready for discharge on postpartum day 2.     Infant:   female  fetus 3075 g (6 lb 12.5 oz)  with Apgar scores of 7 , 8  at five minutes.    Condition on Discharge:  Stable    Vital Signs  Temp:  [98.1 °F (36.7 °C)-98.5 °F (36.9 °C)] 98.5 °F (36.9 °C)  Heart Rate:  [73-80] 73  Resp:  [16] 16  BP: (115-122)/(57-74) 115/74    Lab Results   Component Value Date    WBC 10.98 (H) 09/15/2021    HGB 10.5 (L) 09/15/2021    HCT 31.8 (L) 09/15/2021    MCV 83.9 09/15/2021     09/15/2021       Discharge Disposition  Home or Self Care    Discharge Medications     Discharge Medications      New Medications      Instructions Start Date   ibuprofen 600 MG tablet  Commonly known as: ADVIL,MOTRIN   600 mg, Oral, Every 6 Hours PRN         Continue These Medications      Instructions Start Date   Prenatal Vitamin 27-0.8 MG tablet   Oral         Stop These Medications    ondansetron ODT 4 MG disintegrating tablet  Commonly known as: Zofran ODT     pantoprazole 40 MG EC tablet  Commonly known as: PROTONIX     PROBIOTIC PO            Discharge Diet:     Activity at Discharge:   Activity  Instructions     Pelvic Rest            Follow-up Appointments  Future Appointments   Date Time Provider Department Center   9/24/2021  9:00 AM [facility]     Additional Instructions for the Follow-ups that You Need to Schedule     Call MD With Problems / Concerns   As directed      Discharge Follow-up with Specified Provider: Chitra; 6 Weeks   As directed      To: Chitra    Follow Up: 6 Weeks               Shruthi Fox, SWETHA  09/16/21  09:43 EDT  Cs[facility]

## 2021-09-17 NOTE — H&P
[facility]  Obstetric History and Physical    Chief Complaint   Patient presents with   • Scheduled Induction       Subjective     Patient is a 36 y.o. female  currently at 37w5d, who was sent for delivery  BPP .    Her prenatal care is otherwise c/b A1DM and AMA.  Her previous obstetric/gynecological history is noted for is remarkable for previous 2nd trimester loss (aneuploidy) and previous TSVD.    The following portions of the patients history were reviewed and updated as appropriate: current medications, allergies, past medical history, past surgical history, past family history, past social history and problem list .       Prenatal Information:  Prenatal Results     POC Urine Glucose/Protein     Test Value Reference Range Date Time    Urine Glucose  Negative mg/dL Negative, 1000 mg/dL (3+) 21 1230    Urine Protein  Negative mg/dL Negative 21 1230          Initial Prenatal Labs     Test Value Reference Range Date Time    Hemoglobin  13.6 g/dL 11.1 - 15.9 21 0924    Hematocrit  39.8 % 34.0 - 46.6 21 0924    Platelets  320 10*3/mm3 140 - 450 09/15/21 0632       323 10*3/mm3 140 - 450 21 1356       349 10*3/mm3 140 - 450 21 1846       313 10*3/mm3 140 - 450 21 0951       314 x10E3/uL 150 - 450 21 0924    Rubella IgG  4.38 index Immune >0.99 2124    Hepatitis B SAg  Negative  Negative 21    Hepatitis C Ab  <0.1 s/co ratio 0.0 - 0.9 2124    RPR  Non Reactive  Non Reactive 21 0924    ABO  A   21 1356    Rh  Positive   21 1356    Antibody Screen  Negative  Negative 2124    HIV  Non Reactive  Non Reactive 21 0924    Urine Culture  Final report   21        Final report   21 09    Gonorrhea ^ Negative   10/26/18     Chlamydia ^ Negative   10/26/18     TSH              2nd and 3rd Trimester     Test Value Reference Range Date Time    Hemoglobin (repeated)  10.5 g/dL 12.0 - 15.9  09/15/21 0632       11.7 g/dL 12.0 - 15.9 09/13/21 1356       14.2 g/dL 12.0 - 15.9 08/11/21 1846       11.7 g/dL 12.0 - 15.9 07/16/21 0951    Hematocrit (repeated)  31.8 % 34.0 - 46.6 09/15/21 0632       35.3 % 34.0 - 46.6 09/13/21 1356       41.9 % 34.0 - 46.6 08/11/21 1846       34.6 % 34.0 - 46.6 07/16/21 0951    GCT  166 mg/dL 65 - 139 07/16/21 0951    Antibody Screen (repeated)  Negative   09/13/21 1356       Negative   08/11/21 1847       Negative  Negative 07/16/21 0951    GTT Fasting  100 mg/dL 65 - 94 07/26/21 0921    GTT 1 Hr  199 mg/dL 65 - 179 07/26/21 0921    GTT 2 Hr  195 mg/dL 65 - 154 07/26/21 0921    GTT 3 Hr  150 mg/dL 65 - 139 07/26/21 0921    Group B Strep  Positive  Negative 09/03/21 1130          Drug Screening     Test Value Reference Range Date Time    Amphetamine Screen  Negative ng/mL Ftqlav=3338 03/05/21 0924    Barbiturate Screen  Negative ng/mL Iwjbmt=690 03/05/21 0924    Benzodiazepine Screen  Negative ng/mL Ikbxsc=195 03/05/21 0924    Methadone Screen  Negative ng/mL Yiyaqi=804 03/05/21 0924    Phencyclidine Screen  Negative ng/mL Cutoff=25 03/05/21 0924    Opiates Screen  Negative ng/mL Aoulen=719 03/05/21 0924    THC Screen  Negative ng/mL Cutoff=20 03/05/21 0924    Cocaine Screen  Negative ng/mL Dzftlh=144 03/05/21 0924    Propoxyphene Screen  Negative ng/mL Eqesvm=893 03/05/21 0924    Buprenorphine Screen        Methamphetamine Screen        Oxycodone Screen        Tricyclic Antidepressants Screen              Other (Risk screening)     Test Value Reference Range Date Time    Varicella IgG        Parvovirus IgG        CMV IgG        Cystic Fibrosis        Hemoglobin electrophoresis        NIPT        MSAFP-4        AFP (for NTD only)              Legend    ^: Historical                      External Prenatal Results     Pregnancy Outside Results - Transcribed From Office Records - See Scanned Records For Details     Test Value Date Time    ABO  A  09/13/21 1356    Rh  Positive   21 1356    Antibody Screen  Negative  21 1356       Negative  21 1847       Negative  21 0951       Negative  21    Varicella IgG       Rubella  4.38 index 21    Hgb  10.5 g/dL 09/15/21 0632       11.7 g/dL 21 1356       14.2 g/dL 21 1846       11.7 g/dL 21 0951       13.6 g/dL 21 09    Hct  31.8 % 09/15/21 0632       35.3 % 21 1356       41.9 % 21 1846       34.6 % 21 0951       39.8 % 21    Glucose Fasting GTT  100 mg/dL 21    Glucose Tolerance Test 1 hour  199 mg/dL 21    Glucose Tolerance Test 3 hour  150 mg/dL 21    Gonorrhea (discrete) ^ Negative  10/26/18     Chlamydia (discrete) ^ Negative  10/26/18     RPR  Non Reactive  21    VDRL       Syphilis Antibody       HBsAg  Negative  21    Herpes Simplex Virus PCR       Herpes Simplex VIrus Culture       HIV  Non Reactive  21    Hep C RNA Quant PCR       Hep C Antibody  <0.1 s/co ratio 21    AFP       Group B Strep  Positive  21 1130    GBS Susceptibility to Clindamycin       GBS Susceptibility to Erythromycin       Fetal Fibronectin       Genetic Testing, Maternal Blood             Drug Screening     Test Value Date Time    Urine Drug Screen ^ Negative  10/12/18     Amphetamine Screen  Negative ng/mL 21    Barbiturate Screen  Negative ng/mL 21    Benzodiazepine Screen  Negative ng/mL 21    Methadone Screen  Negative ng/mL 21    Phencyclidine Screen  Negative ng/mL 21    Opiates Screen       THC Screen       Cocaine Screen       Propoxyphene Screen  Negative ng/mL 21    Buprenorphine Screen       Methamphetamine Screen       Oxycodone Screen       Tricyclic Antidepressants Screen             Legend    ^: Historical                         Past OB History:     OB History    Para Term  AB Living   4 3 2 0  1 2   SAB TAB Ectopic Molar Multiple Live Births   1 0 0 0 0 2      # Outcome Date GA Lbr Aaron/2nd Weight Sex Delivery Anes PTL Lv   4 Term 09/14/21 37w5d 21:20 / 00:23 3075 g (6 lb 12.5 oz) F Vag-Spont EPI N PAULINE      Name: AV ALBERT      Apgar1: 7  Apgar5: 8   3 SAB 10/2020           2 Term 05/13/19 40w4d 38:50 / 03:58 4255 g (9 lb 6.1 oz) F Vag-Spont EPI N PAULINE      Name: ADARSH ALBERTIRMANEDEP      Apgar1: 8  Apgar5: 9   1 Para 12/22/17 14w5d    Vag-Spont None  FD      Name: ROOSEVELTPENDING  FD      Apgar1: 0  Apgar5: 0       Past Medical History: Past Medical History:   Diagnosis Date   • ADHD    • Bipolar disorder (CMS/HCC)    • Depression       Past Surgical History Past Surgical History:   Procedure Laterality Date   • MOUTH SURGERY     • TOOTH EXTRACTION     • WISDOM TOOTH EXTRACTION        Family History: Family History   Problem Relation Age of Onset   • Breast cancer Other    • Hypertension Other       Social History:  reports that she has quit smoking. She has never used smokeless tobacco.   reports no history of alcohol use.   reports no history of drug use.        Review of Systems      Objective     Vital Signs Range for the last 24 hours  Temperature: Temp:  [98.5 °F (36.9 °C)] 98.5 °F (36.9 °C)   Temp Source: Temp src: Oral   BP: BP: (115)/(74) 115/74   Pulse: Heart Rate:  [73] 73   Respirations: Resp:  [16] 16   SPO2:     O2 Amount (l/min):     O2 Devices     Weight:       Physical Examination: General appearance - alert, well appearing, and in no distress  Neck - supple, no significant adenopathy  Abdomen - soft, nontender, nondistended, no masses or organomegaly  Pelvic - normal external genitalia, vulva, vagina, cervix, uterus and adnexa  Musculoskeletal - no joint tenderness, deformity or swelling  Extremities - peripheral pulses normal, no pedal edema, no clubbing or cyanosis  Skin - normal coloration and turgor, no rashes, no suspicious skin lesions noted    Presentation: vtx   Cervix:  Exam by:  MD   Dilation:  FT   Effacement:  70   Station:  -3     Fetal Heart Rate Assessment   Method:     Beats/min:     Baseline:     Varibility:     Accels:     Decels:     Tracing Category:       Uterine Assessment   Method:     Frequency (min):     Ctx Count in 10 min:     Duration:     Intensity:     Intensity by IUPC:     Resting Tone:     Resting Tone by IUPC:     [place]on Units:       Laboratory Results: GBS pos    Assessment/Plan       Pregnant      Assessment & Plan    Assessment:  1.  Intrauterine pregnancy at 37w5d weeks gestation with now reactive tracing, but BPP 4/8.    2.  AMA  3.  A1DM  4.  GBS status: pos    Plan:  1. fetal and uterine monitoring  continuously, cervical ripening with  low dose Pitocin and intra-uterine myrick catheter, labor augmentation  Pitocin, analgesia with  epidural and antibiotic for GBS  2. Plan of care has been reviewed with patient   3.  Risks, benefits of treatment plan have been discussed.  4.  All questions have been answered.      Jessica Buenrostro MD  9/17/2021  05:58 EDT

## 2021-09-24 ENCOUNTER — APPOINTMENT (OUTPATIENT)
Dept: PREADMISSION TESTING | Facility: HOSPITAL | Age: 36
End: 2021-09-24

## 2021-10-29 ENCOUNTER — OFFICE VISIT (OUTPATIENT)
Dept: OBSTETRICS AND GYNECOLOGY | Facility: CLINIC | Age: 36
End: 2021-10-29

## 2021-10-29 VITALS
WEIGHT: 201 LBS | BODY MASS INDEX: 33.49 KG/M2 | SYSTOLIC BLOOD PRESSURE: 130 MMHG | DIASTOLIC BLOOD PRESSURE: 92 MMHG | HEIGHT: 65 IN

## 2021-10-29 PROCEDURE — 0503F POSTPARTUM CARE VISIT: CPT | Performed by: OBSTETRICS & GYNECOLOGY

## 2025-07-25 NOTE — PROGRESS NOTES
Initial ob visit     CC- Here for care of pregnancy        Reina Benitez is a 35 y.o. female, , who presents for her first obstetrical visit.  Her last LMP was Patient's last menstrual period was 2020. US today reveals 8w2d GS that appears to represent a blighted ovum. Pt denies bldg or pain. MBT A positive    OB History    Para Term  AB Living   3 2 1 0 0 1   SAB TAB Ectopic Molar Multiple Live Births   0 0 0 0 0 1      # Outcome Date GA Lbr Aaron/2nd Weight Sex Delivery Anes PTL Lv   3 Current            2 Term 19 40w4d 38:50 / 03:58 4255 g (9 lb 6.1 oz) F Vag-Spont EPI N PAULINE   1 Para 17 14w5d    Vag-Spont None  FD       Initial positive test date : 1 week ago @ home         Prior obstetric issues, potential pregnancy concerns: none  Family history of genetic issues (includes FOB): none  Prior infections concerning in pregnancy (Rash, fever in last 2 weeks): none  Varicella Hx -yes  Prior testing for Cystic Fibrosis Carrier or Sickle Cell Trait- daughter +for sickle cell trait  Prepregnancy BMI - There is no height or weight on file to calculate BMI.  History of STD: no    Additional Pertinent History   Last Pap : 2019  Last Completed Pap Smear       Status Date      PAP SMEAR No completions recorded        History of abnormal Pap smear: no  Family history of uterine, colon, breast, or ovarian cancer: yes - Breast CA PGM  Performs monthly Self-Breast Exam: yes  Exercises Regularly: no  Feelings of Anxiety or Depression: no  Tobacco Usage?: No   Alcohol/Drug Use?: NO  Over the age of 35 at delivery: yes  Desires Genetic Screening: NA      PMH  Past Medical History:   Diagnosis Date   • ADHD    • Bipolar disorder (CMS/HCC)    • Depression        Current Outpatient Medications:   •  ibuprofen (ADVIL,MOTRIN) 600 MG tablet, Take 1 tablet by mouth Every 6 (Six) Hours As Needed for Mild Pain ., Disp: 30 tablet, Rfl: 0  •  Prenatal Vit-Fe Fumarate-FA (PRENATAL ) 27-1 MG  No care due was identified.  [facility] Embedded Care Due Messages. Reference number: [id number].   7/25/2025 12:59:51 AM CDT   tablet tablet, Take 1 tablet by mouth Daily., Disp: , Rfl:   •  Probiotic Product (PROBIOTIC DAILY PO), Take 2 capsules by mouth Daily., Disp: , Rfl:     The additional following portions of the patient's history were reviewed and updated as appropriate: allergies, current medications, past family history, past medical history, past social history, past surgical history and problem list.    Review of Systems   Review of Systems  Current obstetric complaints : none   All systems reviewed and otherwise normal.    I have reviewed and agree with the HPI, ROS, and historical information as entered above. Jessica Buenrostro MD    /76   LMP 2020     Physical Exam  General Appearance:    Alert, cooperative, in no acute distress,    Head:    Normocephalic, without obvious abnormality, atraumatic   Neck:   No adenopathy, supple, trachea midline, no thyromegaly   Back:     No kyphosis present, no scoliosis present,                       Lungs:     Clear to auscultation,respirations regular, even and     unlabored           Abdomen:     Normal bowel sounds, no masses, no organomegaly, soft        non-tender, non-distended, no guarding, no rebound                 tenderness       Extremities:   Moves all extremities well, no edema, no cyanosis, no         redness   Pulses:   Pulses palpable and equal bilaterally   Skin:   No bleeding, bruising or rash   Lymph nodes:   No palpable adenopathy   Neurologic:   Sensation intact, A&O times 3      Physical Exam has been reviewed and confirmed by Jessica Buenrostro MD    Objective       Imaging   Pelvic ultrasound report    Assessment/Plan   ASSESSMENT  1. 35 y.o. year old  at 8w3d   2. Blighted ovum    PLAN  Reviewed sono results, likely reasons, AMA, and options for expectant, medical or surgical management.  Patient wants expectant.  Rh pos  F/u 2wk for sono if no bleeding sooner.  Discussed what to expect.    Follow up: 2 week(s)       This note was  electronically signed.    Jessica Buenrostro MD  September 28, 2020